# Patient Record
Sex: MALE | Race: WHITE | Employment: OTHER | URBAN - METROPOLITAN AREA
[De-identification: names, ages, dates, MRNs, and addresses within clinical notes are randomized per-mention and may not be internally consistent; named-entity substitution may affect disease eponyms.]

---

## 2018-10-20 ENCOUNTER — HOSPITAL ENCOUNTER (INPATIENT)
Facility: HOSPITAL | Age: 83
LOS: 4 days | Discharge: HOME | DRG: 064 | End: 2018-10-24
Attending: EMERGENCY MEDICINE | Admitting: INTERNAL MEDICINE
Payer: MEDICARE

## 2018-10-20 ENCOUNTER — APPOINTMENT (OUTPATIENT)
Dept: CT IMAGING | Facility: HOSPITAL | Age: 83
DRG: 064 | End: 2018-10-20
Attending: EMERGENCY MEDICINE
Payer: MEDICARE

## 2018-10-20 ENCOUNTER — APPOINTMENT (OUTPATIENT)
Dept: CT IMAGING | Facility: HOSPITAL | Age: 83
DRG: 064 | End: 2018-10-20
Payer: MEDICARE

## 2018-10-20 ENCOUNTER — APPOINTMENT (OUTPATIENT)
Dept: ULTRASOUND IMAGING | Facility: HOSPITAL | Age: 83
DRG: 064 | End: 2018-10-20
Attending: Other
Payer: MEDICARE

## 2018-10-20 DIAGNOSIS — I63.9 ACUTE ISCHEMIC STROKE (HCC): ICD-10-CM

## 2018-10-20 DIAGNOSIS — I63.9 ACUTE CVA (CEREBROVASCULAR ACCIDENT) (HCC): Primary | ICD-10-CM

## 2018-10-20 PROCEDURE — 70450 CT HEAD/BRAIN W/O DYE: CPT | Performed by: EMERGENCY MEDICINE

## 2018-10-20 PROCEDURE — 70498 CT ANGIOGRAPHY NECK: CPT | Performed by: EMERGENCY MEDICINE

## 2018-10-20 PROCEDURE — 99223 1ST HOSP IP/OBS HIGH 75: CPT | Performed by: OTHER

## 2018-10-20 PROCEDURE — 70496 CT ANGIOGRAPHY HEAD: CPT | Performed by: EMERGENCY MEDICINE

## 2018-10-20 RX ORDER — HYDROCODONE BITARTRATE AND ACETAMINOPHEN 5; 325 MG/1; MG/1
2 TABLET ORAL 3 TIMES DAILY PRN
Status: ON HOLD | COMMUNITY
End: 2019-01-08

## 2018-10-20 RX ORDER — ONDANSETRON 2 MG/ML
4 INJECTION INTRAMUSCULAR; INTRAVENOUS EVERY 6 HOURS PRN
Status: DISCONTINUED | OUTPATIENT
Start: 2018-10-20 | End: 2018-10-24

## 2018-10-20 RX ORDER — CLOPIDOGREL 300 MG/1
75 TABLET, FILM COATED ORAL ONCE
Status: ON HOLD | COMMUNITY
End: 2019-01-08

## 2018-10-20 RX ORDER — METOPROLOL TARTRATE 5 MG/5ML
INJECTION INTRAVENOUS
Status: COMPLETED
Start: 2018-10-20 | End: 2018-10-20

## 2018-10-20 RX ORDER — CLOPIDOGREL BISULFATE 75 MG/1
300 TABLET ORAL ONCE
Status: COMPLETED | OUTPATIENT
Start: 2018-10-20 | End: 2018-10-20

## 2018-10-20 RX ORDER — CLOPIDOGREL BISULFATE 75 MG/1
75 TABLET ORAL DAILY
Status: DISCONTINUED | OUTPATIENT
Start: 2018-10-20 | End: 2018-10-24

## 2018-10-20 RX ORDER — METOPROLOL TARTRATE 5 MG/5ML
5 INJECTION INTRAVENOUS EVERY 6 HOURS PRN
Status: DISCONTINUED | OUTPATIENT
Start: 2018-10-20 | End: 2018-10-24

## 2018-10-20 RX ORDER — PRAVASTATIN SODIUM 10 MG
40 TABLET ORAL NIGHTLY
Status: ON HOLD | COMMUNITY
End: 2019-01-08

## 2018-10-20 RX ORDER — ASPIRIN 81 MG/1
324 TABLET, CHEWABLE ORAL DAILY
Status: ON HOLD | COMMUNITY
End: 2019-01-08

## 2018-10-20 RX ORDER — PRAVASTATIN SODIUM 10 MG
10 TABLET ORAL NIGHTLY
Status: DISCONTINUED | OUTPATIENT
Start: 2018-10-20 | End: 2018-10-21

## 2018-10-20 RX ORDER — ASPIRIN 81 MG/1
81 TABLET, CHEWABLE ORAL DAILY
Status: DISCONTINUED | OUTPATIENT
Start: 2018-10-20 | End: 2018-10-24

## 2018-10-20 RX ORDER — HYDROCODONE BITARTRATE AND ACETAMINOPHEN 5; 325 MG/1; MG/1
2 TABLET ORAL 3 TIMES DAILY PRN
Status: DISCONTINUED | OUTPATIENT
Start: 2018-10-20 | End: 2018-10-24

## 2018-10-20 RX ORDER — 0.9 % SODIUM CHLORIDE 0.9 %
VIAL (ML) INJECTION
Status: COMPLETED
Start: 2018-10-20 | End: 2018-10-20

## 2018-10-20 RX ORDER — TAMSULOSIN HYDROCHLORIDE 0.4 MG/1
0.4 CAPSULE ORAL DAILY
Status: ON HOLD | COMMUNITY
End: 2019-01-08

## 2018-10-20 NOTE — CONSULTS
[facility] - [facility]    Report of Consultation    Nasirjessica Funk Patient Status:  Emergency    1935 MRN [id number]   Location [address] Attending Elliott Sanchez MD   Hosp Day # 0 PCP No primary care provide reviewed. No pertinent family history.     Social History  Patient Guardian Status:  Not on file    Other Topics            Concern    None on file    Social History Narrative    None on file            Current Medications:    No current facility-administer symmetric  Brachioradialis 2 + bilateral symmetric  Patellar 2+ bilateral symmetric  Ankle jerk 0 bilateral symmetric    No clonus  No Babinski sign    Coordination:  Finger to nose unable to assess  Rapid alternating movements intact    Gait:  Unable to a the stroke workup will be initiated. MRI brain without contrast  LDL will be checked  Doppler carotids will be done. Echo will be done. Swallow evaluation will be done.   PT/OT evaluation  Continue with antiplatelet therapy    Thank you for allowing me

## 2018-10-20 NOTE — ED PROVIDER NOTES
Patient Seen in: [facility] AND [facility] Emergency Department    History   Patient presents with:  Altered Mental Status (neurologic)    Stated Complaint: confusion    HPI    The patient is an 21-year-old male with a history of atherosclerosis, hypertension, T (Temporal)   Resp 18   Wt 90.5 kg   SpO2 97%         Physical Exam   Constitutional: He appears well-developed and well-nourished. HENT:   Head: Normocephalic and atraumatic.    Mouth/Throat: Oropharynx is clear and moist.   Eyes: Conjunctivae and EOM are Final result                 Please view results for these tests on the individual orders. TYPE AND SCREEN    Narrative: The following orders were created for panel order TYPE AND SCREEN.   Procedure                               Abnormality         S was called immediately at 1511 hours to the emergency department and discussed with Dr. Jim Joshi.       Dictated by (CST): Francisco Frost MD on 10/20/2018 at 15:08     Approved by (CST): Francisco Frost MD on 10/20/2018 at [address]

## 2018-10-20 NOTE — PROGRESS NOTES
10/20/18 1546   Clinical Encounter Type   Visited With Family   Routine Visit Introduction   Continue Visiting Yes   Crisis Visit (stroke alert)   Anglican Encounters   Anglican Needs Prayer       Pt going for testing, follow up once in a room

## 2018-10-20 NOTE — ED NOTES
Patient taken to CT with RN and PCT at this time. Patient confused with expressive aphasia. Onset apx 1 hour ago per daughter at bedside.

## 2018-10-20 NOTE — ED NOTES
Pt's family states at pt went back to bed at 9:30am. At 1:45 pm, his grandson was calling for help to get him up because he wasn't talking clearly. 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

## 2018-10-21 ENCOUNTER — APPOINTMENT (OUTPATIENT)
Dept: CT IMAGING | Facility: HOSPITAL | Age: 83
DRG: 064 | End: 2018-10-21
Attending: Other
Payer: MEDICARE

## 2018-10-21 ENCOUNTER — APPOINTMENT (OUTPATIENT)
Dept: MRI IMAGING | Facility: HOSPITAL | Age: 83
DRG: 064 | End: 2018-10-21
Attending: Other
Payer: MEDICARE

## 2018-10-21 ENCOUNTER — APPOINTMENT (OUTPATIENT)
Dept: CV DIAGNOSTICS | Facility: HOSPITAL | Age: 83
DRG: 064 | End: 2018-10-21
Attending: Other
Payer: MEDICARE

## 2018-10-21 ENCOUNTER — APPOINTMENT (OUTPATIENT)
Dept: ULTRASOUND IMAGING | Facility: HOSPITAL | Age: 83
DRG: 064 | End: 2018-10-21
Attending: Other
Payer: MEDICARE

## 2018-10-21 PROCEDURE — 93306 TTE W/DOPPLER COMPLETE: CPT | Performed by: OTHER

## 2018-10-21 PROCEDURE — 70450 CT HEAD/BRAIN W/O DYE: CPT | Performed by: OTHER

## 2018-10-21 PROCEDURE — 93880 EXTRACRANIAL BILAT STUDY: CPT | Performed by: OTHER

## 2018-10-21 PROCEDURE — 99232 SBSQ HOSP IP/OBS MODERATE 35: CPT | Performed by: OTHER

## 2018-10-21 RX ORDER — HALOPERIDOL 5 MG/ML
INJECTION INTRAMUSCULAR
Status: COMPLETED
Start: 2018-10-21 | End: 2018-10-21

## 2018-10-21 RX ORDER — HALOPERIDOL 5 MG/ML
1 INJECTION INTRAMUSCULAR ONCE
Status: DISCONTINUED | OUTPATIENT
Start: 2018-10-21 | End: 2018-10-24

## 2018-10-21 RX ORDER — 0.9 % SODIUM CHLORIDE 0.9 %
VIAL (ML) INJECTION
Status: COMPLETED
Start: 2018-10-21 | End: 2018-10-21

## 2018-10-21 RX ORDER — LORAZEPAM 2 MG/ML
2 INJECTION INTRAMUSCULAR EVERY 4 HOURS PRN
Status: DISCONTINUED | OUTPATIENT
Start: 2018-10-21 | End: 2018-10-24

## 2018-10-21 RX ORDER — PRAVASTATIN SODIUM 20 MG
20 TABLET ORAL NIGHTLY
Status: DISCONTINUED | OUTPATIENT
Start: 2018-10-21 | End: 2018-10-24

## 2018-10-21 NOTE — CM/SW NOTE
SW met w/ pt and pt's family to discuss recommendations of acute rehab. Family requesting referral to [address] at this time. Per dtrs, pt is affiliated w/ [facility]. SW requested PMR consult from CYNDEE Gage, [address]

## 2018-10-21 NOTE — PHYSICAL THERAPY NOTE
PHYSICAL THERAPY EVALUATION - INPATIENT     Room Number: 320/320-A  Evaluation Date: 10/21/2018  Type of Evaluation: Initial   Physician Order: PT Eval and Treat    Presenting Problem: acute CVA  Reason for Therapy: Mobility Dysfunction and Discharge Plan Daily       PHYSICAL THERAPY MEDICAL/SOCIAL HISTORY   Problem List  Principal Problem:    Acute CVA (cerebrovascular accident) [facility])  Active Problems:    Acute ischemic stroke [facility])      Past Medical History  Past Medical History:   Diagnosis Date   • Athe from lying on back to sitting on the side of the bed?: A Lot   How much help from another person does the patient currently need. ..   -   Moving to and from a bed to a chair (including a wheelchair)?: A Lot   -   Need to walk in hospital room?: A Lot   -

## 2018-10-21 NOTE — H&P
[facility] Patient Status:  Inpatient    1935 MRN [id number]   Location [facility] 3W/SW Attending Mary Kay Macile MD   Hosp Day # 0 PCP No primary care provider on file.      Date:   negative  Ears, nose, mouth, throat, and face: negative  Respiratory: negative for cough, dyspnea on exertion, hemoptysis, sputum and wheezing  Cardiovascular: negative for chest pain, dyspnea, orthopnea, palpitations and syncope  Gastrointestinal: negativ care discussed in detail with daughter bedside. All questions answered.     Certification      PHYSICIAN Certification of Need for Inpatient Hospitalization - Initial Certification    Patient will require inpatient services that will reasonably be expected

## 2018-10-21 NOTE — SLP NOTE
SLP attempted at ; however, pt asleep. SLP collaborated with RN to discuss if it would be appropriate to wake pt for evaluations given his dx/presentation and the fact that he had other evaluations today.  RN stated that pt would need anti-psychotic med

## 2018-10-21 NOTE — PLAN OF CARE
Problem: Patient/Family Goals  Goal: Patient/Family Long Term Goal  Patient's Long Term Goal:   Interventions:  - rehab following discharge  -take all med as prescribed  - See additional Care Plan goals for specific interventions   Outcome: Not Progressing self care with guidance from PT/OT  - Encourage visually impaired, hearing impaired and aphasic patients to use assistive/communication devices  Outcome: Progressing      Problem: PAIN - ADULT  Goal: Verbalizes/displays adequate comfort level or patient's assist at discharge as needed  - Consider post-discharge preferences of patient/family/discharge partner  - Complete POLST form as appropriate  - Assess patient's ability to be responsible for managing their own health  - Refer to Case Management Oswald Jospeh function  INTERVENTIONS:  - Assess patient stability and activity tolerance for standing, transferring and ambulating w/ or w/o assistive devices  - Assist with transfers and ambulation using safe patient handling equipment as needed  - Ensure adequate pro

## 2018-10-21 NOTE — PROGRESS NOTES
[facility]  Neurology Progress Note    Aline Ramirez Patient Status:  Inpatient    1935 MRN [id number]   Location [facility] 3W/SW Attending Bria Hinton MD   Hosp Day # 1 PCP No primary care provider on file.      Subjective:  T confrontation        Fundoscopically- No papilledema or retinal hemorrhages. Normal optic discs, sharp edges. III, IV, VI- EOM intact, TRUDI  V. Facial sensation unable to assess  VII. Face symmetric, no facial weakness  VIII.  Hearing intact to whisper, t MRI brain should be obtained. This report was called immediately at 1511 hours to the emergency department and discussed with Dr. Nicho Kowalski.       Dictated by (CST): Liv Weiner MD on 10/20/2018 at 15:08     Approved by (CST): Liv Weiner MD on 10/20/2018 at Plavix and aspirin. His LDL still not below the 70, however it appears to be in the maximum dose of a statin at this point already. Doppler of carotids did not reveal stenosis that would require intervention.   Patient will start with speech and language

## 2018-10-21 NOTE — PROGRESS NOTES
Pt agitated and trying to get out of bed. Pt unresponsive to ativan. MD's notified. One time dose of haldol ordered, see MAR.

## 2018-10-21 NOTE — PLAN OF CARE
Problem: Patient/Family Goals  Goal: Patient/Family Long Term Goal  Patient's Long Term Goal:   Interventions:  - rehab following discharge  -take all med as prescribed  - See additional Care Plan goals for specific interventions  Outcome: Progressing    G with guidance from PT/OT  - Encourage visually impaired, hearing impaired and aphasic patients to use assistive/communication devices  Outcome: Progressing

## 2018-10-21 NOTE — CONSULTS
[place] [facility] - [facility]    Cardiology Consultation    Matias Rivero Patient Status:  Inpatient    1935 MRN [id number]   Location [facility] 3W/SW Attending Rebekah Bains MD   [facility] Day # 1 PCP No primary care provider on file.      10/ LORazepam (ATIVAN) injection 2 mg, 2 mg, Intravenous, Q4H PRN  •  Pravastatin Sodium (PRAVACHOL) tab 20 mg, 20 mg, Oral, Nightly  •  Clopidogrel Bisulfate (PLAVIX) tab 75 mg, 75 mg, Oral, Daily  •  aspirin chewable tab 81 mg, 81 mg, Oral, Daily  •  HYDROco 10/20/2018    CO2 25 10/20/2018     10/20/2018    CA 9.5 10/20/2018    PTT 26.3 10/20/2018    INR 1.1 10/20/2018    PTP 14.3 10/20/2018    TROP 0.00 10/20/2018    PGLU 107 10/20/2018       Imaging:  Ct Brain Or Head ([id number])    Result Date: 10/21/201 called to Dr. Marck Espinosa at 4:48 PM on 10/20/2018    Dictated by (CST): Harpal Mckeon MD on 10/20/2018 at 16:28     Approved by (CST): Harpal Mckeon MD on 10/20/2018 at 16:50          Ekg 12-lead    Result Date: 10/20/2018  ECG Report  Interpretation  -------

## 2018-10-21 NOTE — OCCUPATIONAL THERAPY NOTE
OCCUPATIONAL THERAPY EVALUATION - INPATIENT     Room Number: 320/320-A  Evaluation Date: 10/21/2018  Type of Evaluation: Initial  Presenting Problem: (CVA)    Physician Order: IP Consult to Occupational Therapy  Reason for Therapy: ADL/IADL Dysfunction and Anticipate patient to d/c to acute rehab pending his ability to participate and attend. Will continue to assess. He was left in bed, with multiple family members at bedside, bed alarm on and all needs met. Notified RN.      DISCHARGE RECOMMENDATIONS  OT Dis High fall risk(Simultaneous filing.  User may not have seen previous data.)    PAIN ASSESSMENT  Ratin     COGNITION  Orientation Level:  disoriented x4    VISION  unable to assess    PERCEPTION  unable to assess    SENSATION  unable to assess    Communi OF in sitting with setup  Comment:     Patient will tolerate standing for 2 minutes in prep for adls with min a    Comment:            Goals  on:   Frequency: 5x a week    [facility] MOT/R  200  [facility]  #18

## 2018-10-22 ENCOUNTER — APPOINTMENT (OUTPATIENT)
Dept: CV DIAGNOSTICS | Facility: HOSPITAL | Age: 83
DRG: 064 | End: 2018-10-22
Attending: INTERNAL MEDICINE
Payer: MEDICARE

## 2018-10-22 PROCEDURE — 99232 SBSQ HOSP IP/OBS MODERATE 35: CPT | Performed by: OTHER

## 2018-10-22 PROCEDURE — 93308 TTE F-UP OR LMTD: CPT | Performed by: INTERNAL MEDICINE

## 2018-10-22 RX ORDER — 0.9 % SODIUM CHLORIDE 0.9 %
VIAL (ML) INJECTION
Status: COMPLETED
Start: 2018-10-22 | End: 2018-10-22

## 2018-10-22 NOTE — PROGRESS NOTES
[facility] AND [facility]  Neurology Progress Note    Tabby Hernandez Patient Status:  Inpatient    1935 MRN [id number]   Location [facility] 3W/SW Attending Kwabena Hernandez MD   Hosp Day # 2 PCP None Pcp     Subjective:  Tabby Hernandez is a(n) comprehend    Cranial Nerves:  II.- Visual fields full to confrontation        Fundoscopically- No papilledema or retinal hemorrhages. Normal optic discs, sharp edges. III, IV, VI- EOM intact, TRUDI  V. Facial sensation unable to assess  VII.  Face symmetr MD on 10/21/2018 at 11:17     Approved by (CST): Julian Herrera MD on 10/21/2018 at 11:19          Ct Stroke Brain (no Iv)(cpt=70450)    Result Date: 10/20/2018  CONCLUSION:  Mild chronic microvascular ischemic disease without acute intracranial a [facility])     Acute ischemic stroke [facility])    Patient most likely with a stroke, most likely involving Wernicke's area. He is left-handed therefore it is difficult to say what side to be on. There may be very subtle right-sided neglect.   Patient is already on

## 2018-10-22 NOTE — PROGRESS NOTES
[facility]    Progress Note    Abelino Morales Patient Status:  Inpatient    1935 MRN [id number]   Location [facility] 3W/SW Attending Darshan Key MD   Hosp Day # 2 PCP None Pcp       SUBJECTIVE:  Pt seen and examined at diet  PT/OT  PMR consult  SW consult for acute rehab     # HLD: Cont Statin     CPM  POC d/w RN               Divya Colmenares  10/22/2018  11:08 AM

## 2018-10-22 NOTE — SLP NOTE
ADULT SPEECH/SWALLOWING EVALUATION    ASSESSMENT    ASSESSMENT/OVERALL IMPRESSION:  SLP BSSE orders received and acknowledged. A swallow evaluation warranted d/t stroke protocol. Pt and family deny any hx of swallowing difficulties.    Pt positioned upright halting;paraphasic expression. At this time, pt presents with a moderate mixed receptive/expressive language disorder. An \"Aphasia\" informational handout was provided for the daughter at bedside.  Communication picture boards were demonstrated and provide EXAMINATION  Dentition: Functional  Symmetry: Unable to assess  Strength: (reduced )  Tone: (reduced )  Range of Motion: (reduced )  Rate of Motion: Reduced    Voice Quality: Clear  Respiratory Status: Unlabored  Consistencies Trialed: Thin liquids; Nectar session(s). In Progress   Goal #6 The patient will complete Confrontation, Responsve naming tasks with min cues with 100 % accuracy within 5 session(s).      In Progress   Goal #7 The patient will complete word repetition Tasks with min cues with 100 % a

## 2018-10-22 NOTE — PLAN OF CARE
Problem: Patient/Family Goals  Goal: Patient/Family Long Term Goal  Patient's Long Term Goal:   Interventions:  - rehab following discharge  -take all med as prescribed  - See additional Care Plan goals for specific interventions   Outcome: Progressing care with guidance from PT/OT  - Encourage visually impaired, hearing impaired and aphasic patients to use assistive/communication devices  Outcome: Progressing      Problem: PAIN - ADULT  Goal: Verbalizes/displays adequate comfort level or patient's state needed  - Consider post-discharge preferences of patient/family/discharge partner  - Complete POLST form as appropriate  - Assess patient's ability to be responsible for managing their own health  - Refer to Case Management Department for coordinating disc stability and activity tolerance for standing, transferring and ambulating w/ or w/o assistive devices  - Assist with transfers and ambulation using safe patient handling equipment as needed  - Ensure adequate protection for wounds/incisions during mobiliz

## 2018-10-22 NOTE — PROGRESS NOTES
[facility]    Cardiology Progress Note    Aretha Kumari Patient Status:  Inpatient    1935 MRN [id number]   Location [facility] 3W/SW Attending Mary Kay Maciel MD   [facility] Day # 2 PCP None Pcp     10/20/2018  Subjective: Pia Hassan Facility-Administered Medications:  LORazepam (ATIVAN) injection 2 mg 2 mg Intravenous Q4H PRN   Pravastatin Sodium (PRAVACHOL) tab 20 mg 20 mg Oral Nightly   haloperidol lactate (HALDOL) 5 MG/ML injection 1 mg 1 mg Intramuscular Once   Clopidogrel Bisulfa

## 2018-10-22 NOTE — CDS QUERY
Altered Level of Consciousness/Decreased Level of Consciousness Without History of Injury  Daryl Kind  Dear Doctor:  Clinical information (provided below) indicates altered level of consciousness and/or decreased level of con

## 2018-10-22 NOTE — PHYSICAL THERAPY NOTE
PHYSICAL THERAPY TREATMENT NOTE - INPATIENT     Room Number: 320/320-A       Presenting Problem: acute CVA    Problem List  Principal Problem:    Acute CVA (cerebrovascular accident) [facility])  Active Problems:    Acute ischemic stroke [facility])      ASSESSMENT Static Sitting: Good  Dynamic Sitting: Fair +           Static Standing: Fair  Dynamic Standing: Fair -    AM-PAC '6-Clicks' INPATIENT SHORT FORM - BASIC MOBILITY  H x 1    Goal #4 Patient will negotiate 14 stairs/one curb w/ assistive device and supervision   Goal #4   Current Status Not tested    Goal #5 Patient to demonstrate independence with home activity/exercise instructions provided to patient in preparation fo

## 2018-10-22 NOTE — OCCUPATIONAL THERAPY NOTE
OCCUPATIONAL THERAPY TREATMENT NOTE - INPATIENT        Room Number: 320/320-A           Presenting Problem: (CVA)    Problem List  Principal Problem:    Acute CVA (cerebrovascular accident) ([address]  Active Problems:    Acute ischemic stroke ([facility] [address]      ASSESS training;Functional transfer training;Cognitive reorientation;Patient/Family education;Patient/Family training;Equipment eval/education; Neuromuscluar reeducation    SUBJECTIVE  \"I need some water\"     OBJECTIVE  Precautions: Bed/chair alarm(Simultaneous Comment: NT    Patient will tolerate standing for 2 minutes in prep for adls with min a    Comment: goal met, pt tolerates ambulating ~3-5min in hallway with CGA.     NEW GOAL: Patient will complete functional transfer with SBA     NEW GOAL: Patient will co

## 2018-10-22 NOTE — PROGRESS NOTES
[place] [facility] - [facility]    Progress Note    Zaira López Patient Status:  Inpatient    1935 MRN [id number]   Location [facility] 3W/SW Attending Madeleine Harrell MD   Hosp Day # 2 PCP None Pcp       SUBJECTIVE:  Pt seen and examined at Statin     Resume other home medication  Med rec done  POC d/w RN        Plan of care discussed in detail with daughter and Son in law at bedside. All questions answered.                    Kamar Benitez

## 2018-10-22 NOTE — CONSULTS
PHYSICAL MEDICINE AND REHABILITATION CONSULTATION     CC: Impaired mobility and ADL dysfunction secondary to stroke      HPI: This is an 26-year-old gentleman who presented to [facility] on October 20 for expressive aphasia and confusion aixa Otherwise a full 10 point review of systems was conducted and is negative.     Current medications: Full medication list has been personally reviewed and include:    [COMPLETED] Sodium Chloride 0.9 % solution      [COMPLETED] Perflutren Lipid Microsphere (D history. Social History:      Social History    Socioeconomic History      Marital status:        Spouse name: Not on file      Number of children: Not on file      Years of education: Not on file      Highest education level: Not on file    Cole    BALANCE ASSESSMENT  Static Sitting: fair  Dynamic Sitting: fair  Static Standing: fair-  Dynamic Standing: fair-     FUNCTIONAL ADL ASSESSMENT  Feeding: Indep         Precautions: High risk for falls    Code status: Full code    OBJECTIVE:    /7 function indicates that patient is reasonably expected to make meaningful and measurable functional progress during a 10 day inpatient rehab stay.  Anticipated goals are to improve from min to mod assist to supervision level to facilitate safe discharge to

## 2018-10-23 RX ORDER — 0.9 % SODIUM CHLORIDE 0.9 %
VIAL (ML) INJECTION
Status: COMPLETED
Start: 2018-10-23 | End: 2018-10-23

## 2018-10-23 NOTE — PHYSICAL THERAPY NOTE
PHYSICAL THERAPY TREATMENT NOTE - INPATIENT     Room Number: 320/320-A       Presenting Problem: acute CVA    Problem List  Principal Problem:    Acute CVA (cerebrovascular accident) [facility])  Active Problems:    Acute ischemic stroke [facility])      ASSESSMENT PAIN ASSESSMENT   Ratin          BALANCE                                                                                                                     Static Sitting: Good  Dynamic Sitting: Good           Static Standing: Fair +  Dynamic Jose Salmon assistance level: supervision   Goal #3   Current Status 150ft with rolling walker min A x 1    Goal #4 Patient will negotiate 14 stairs/one curb w/ assistive device and supervision   Goal #4   Current Status Not tested    Goal #5 Patient to demonstrate in

## 2018-10-23 NOTE — PROGRESS NOTES
[facility]    Progress Note    Antonella Sawant Patient Status:  Inpatient    1935 MRN [id number]   Location [facility] 3W/SW Attending Margarito Rogers MD   Hosp Day # 3 PCP None Pcp       SUBJECTIVE:  Pt seen and examined at noted, recommended for acute rehab.   RACHAEL consult for acute rehab     # HLD: Cont Statin     CPM    Dispo: acute rehab  POC d/w JAYLEN Heard  10/23/2018  2:26 PM

## 2018-10-23 NOTE — TRANSITION NOTE
[facility]    Cardiology Discharge Summary    Sharmaine Cuba Patient Status:  Inpatient    1935 MRN [id number]   Location [facility] 3W/SW Attending Ramesh Kingsley MD   [facility] Day # 3 PCP None Pcp     Discharge Summary:   As GLU  122*   --    BUN  17   --    CREATSERUM  1.06   --    GFRAA  >60  >60   GFRNAA  >60  >60   CA  9.5   --    NA  139   --    K  4.5   --    CL  103   --    CO2  25   --       Recent Labs   Lab  10/20/18   1530   RBC  5.03   HGB  14.6   HCT  44.7   MCV

## 2018-10-23 NOTE — SLP NOTE
SPEECH DAILY NOTE - INPATIENT (SPEECH)    ASSESSMENT & PLAN   ASSESSMENT  Pt seen for speech therapy to complete auditory comprehension and verbal expression training. Speech is intelligible at the conversational level.   The pt demonstrates ability to par session(s). Phrase repetition and completion completed to 70% accuracy.   In Progress       FOLLOW UP  Follow Up Needed: Yes  SLP Follow-up Date: 10/24/18  Number of Visits to Meet Established Goals: 5    Session: 1 post SLE    If you have any questions

## 2018-10-23 NOTE — SLP NOTE
SPEECH DAILY NOTE - INPATIENT (SWALLOWING)    ASSESSMENT & PLAN   ASSESSMENT  Pt seen for swallowing therapy to monitor swallowing tolerance and train swallowing precautions per BSE recommendations.   Collaborated with RN, whom reports pt is tolerating diet bites and sips; No straws; Slow rate; Alternate consistencies;Multiple swallows  Aspiration Precautions: Upright position; Slow rate;Small bites and sips; No straw  Medication Administration Recommendations: Crushed in puree    Patient Experiencing Pain: No family on swallowing precautions. Cues were provided to pt to slow rate and control amount. The pt's daughter was present and assisted with providing cues to pt. The pt and daughter acknowledged awareness of no straws.   In Progress       FOLLOW UP  Foll

## 2018-10-23 NOTE — OCCUPATIONAL THERAPY NOTE
OCCUPATIONAL THERAPY TREATMENT NOTE - INPATIENT        Room Number: 320/320-A           Presenting Problem: (CVA)    Problem List  Principal Problem:    Acute CVA (cerebrovascular accident) ([address]  Active Problems:    Acute ischemic stroke ([facility] [address]      ASSESS filing.  User may not have seen previous data.)    WEIGHT BEARING RESTRICTION  Weight Bearing Restriction: None                PAIN ASSESSMENT  Ratin           ACTIVITY TOLERANCE                         O2 SATURATIONS                ACTIVITIES OF DAILY safety    NEW GOAL: Patient will complete LB dressing with min A using AE/DME PRN  Not assessed today   Goals  on:   Frequency: 5x a week     Olivia Kidd OTR/L  [facility] AND [facility]

## 2018-10-24 VITALS
RESPIRATION RATE: 18 BRPM | BODY MASS INDEX: 27.17 KG/M2 | OXYGEN SATURATION: 95 % | WEIGHT: 189.81 LBS | HEART RATE: 105 BPM | DIASTOLIC BLOOD PRESSURE: 75 MMHG | HEIGHT: 70 IN | SYSTOLIC BLOOD PRESSURE: 125 MMHG | TEMPERATURE: 98 F

## 2018-10-24 RX ORDER — 0.9 % SODIUM CHLORIDE 0.9 %
VIAL (ML) INJECTION
Status: COMPLETED
Start: 2018-10-24 | End: 2018-10-24

## 2018-10-24 NOTE — PHYSICAL THERAPY NOTE
Attempted to see patient for physical therapy session. Patient refused, despite maximal education and encouragement. Patient to D/C to home, despite recommendation for acute rehab.  Patient's daughter present in room is going to follow up with JESSICA MOBLEY [place] and

## 2018-10-24 NOTE — OCCUPATIONAL THERAPY NOTE
OCCUPATIONAL THERAPY TREATMENT NOTE - INPATIENT        Room Number: 320/320-A           Presenting Problem: (CVA)    Problem List  Principal Problem:    Acute CVA (cerebrovascular accident) ([address]  Active Problems:    Acute ischemic stroke ([facility] [address]      ASSESS not have seen previous data.)    WEIGHT BEARING RESTRICTION  Weight Bearing Restriction: None                PAIN ASSESSMENT  Ratin           ACTIVITY TOLERANCE  Pulse: 105  Heart Rate Source: Monitor     BP: 125/75  BP Location: Right arm  BP Method: ability to reach ankles while seated in chair to assist with LB dressing.      Jason Regan

## 2018-10-24 NOTE — SLP NOTE
SPEECH DAILY NOTE - INPATIENT (SWALLOWING)    ASSESSMENT & PLAN   ASSESSMENT  Pt seen for swallowing therapy to monitor swallowing tolerance and train swallowing precautions with upgraded diet of mechanical soft chopped diet and thin liquids/no straw.   Col Recommendations/Plan: Undetermined    Treatment Plan  Treatment Plan/Recommendations: Dysphagia therapy;Videofluoroscopic swallow study    Interdisciplinary Communication: Discussed with RN            GOALS  Goal #1 The patient will tolerate Mechanical sof

## 2018-10-24 NOTE — CM/SW NOTE
RACHAEL informed by RN that pt no longer wants to go to acute rehab. RACHAEL spoke with pt's daughter Kaity Ames [phone] who stated that they want the pt to be discharged when medically stable to home. Pt lives in house with 2 stairs to enter.  Pt lives with his

## 2018-10-24 NOTE — DISCHARGE SUMMARY
[facility] - [facility]    Discharge Summary    Cassie Regan Patient Status:  Inpatient    1935 MRN [id number]   Location [facility] 3W/SW Attending Shy Burrows MD   [facility] Day [facility] PCP None Pcp     Date of Admission: 10/20/2018 Aishwarya Baker get PT/OT as outpatient.       Consultations: Neurology, PMR    Discharge Condition: Stable         Discharge Medications:      Discharge Medications      CONTINUE taking these medications      Instructions Prescription details   aspirin 81 MG Chew      Fariha

## 2018-10-24 NOTE — SLP NOTE
SPEECH DAILY NOTE - INPATIENT (SPEECH)    ASSESSMENT & PLAN   ASSESSMENT  Pt seen for speech therapy to complete auditory comprehension and verbal expression training.   The pt continues to demonstrates word finding errors with hesitations at the [facility] within 5 session(s). The pt completed level 1/concrete categories with 6 words per minute. Pt was easily distracted and required to be re-directed back to task.   New Goal       FOLLOW UP  Follow Up Needed: Yes  SLP Follow-up Date: 10/25/18  Number of

## 2019-01-03 ENCOUNTER — HOSPITAL ENCOUNTER (INPATIENT)
Facility: HOSPITAL | Age: 84
LOS: 5 days | Discharge: ANOTHER HOSPITAL | DRG: 871 | End: 2019-01-09
Attending: EMERGENCY MEDICINE | Admitting: HOSPITALIST
Payer: MEDICARE

## 2019-01-03 ENCOUNTER — APPOINTMENT (OUTPATIENT)
Dept: CT IMAGING | Facility: HOSPITAL | Age: 84
DRG: 871 | End: 2019-01-03
Attending: EMERGENCY MEDICINE
Payer: MEDICARE

## 2019-01-03 ENCOUNTER — APPOINTMENT (OUTPATIENT)
Dept: GENERAL RADIOLOGY | Facility: HOSPITAL | Age: 84
DRG: 871 | End: 2019-01-03
Attending: EMERGENCY MEDICINE
Payer: MEDICARE

## 2019-01-03 DIAGNOSIS — E86.0 DEHYDRATION: Primary | ICD-10-CM

## 2019-01-03 DIAGNOSIS — N28.9 RENAL INSUFFICIENCY: ICD-10-CM

## 2019-01-03 DIAGNOSIS — C25.9 MALIGNANT NEOPLASM OF PANCREAS, UNSPECIFIED LOCATION OF MALIGNANCY (HCC): ICD-10-CM

## 2019-01-03 DIAGNOSIS — R41.82 ALTERED MENTAL STATUS, UNSPECIFIED ALTERED MENTAL STATUS TYPE: ICD-10-CM

## 2019-01-03 LAB
ALBUMIN SERPL BCP-MCNC: 2.8 G/DL (ref 3.5–4.8)
ALP SERPL-CCNC: 67 U/L (ref 32–100)
ALT SERPL-CCNC: 200 U/L (ref 17–63)
ANION GAP SERPL CALC-SCNC: 18 MMOL/L (ref 0–18)
AST SERPL-CCNC: 192 U/L (ref 15–41)
BILIRUB DIRECT SERPL-MCNC: 0.4 MG/DL (ref 0–0.2)
BILIRUB SERPL-MCNC: 1.3 MG/DL (ref 0.3–1.2)
BUN SERPL-MCNC: 33 MG/DL (ref 8–20)
BUN/CREAT SERPL: 17.2 (ref 10–20)
CALCIUM SERPL-MCNC: 8.9 MG/DL (ref 8.5–10.5)
CHLORIDE SERPL-SCNC: 102 MMOL/L (ref 95–110)
CO2 SERPL-SCNC: 23 MMOL/L (ref 22–32)
CREAT SERPL-MCNC: 1.92 MG/DL (ref 0.5–1.5)
GLUCOSE BLDC GLUCOMTR-MCNC: 184 MG/DL (ref 70–99)
GLUCOSE SERPL-MCNC: 206 MG/DL (ref 70–99)
LIPASE SERPL-CCNC: 16 U/L (ref 22–51)
MAGNESIUM SERPL-MCNC: 2.1 MG/DL (ref 1.8–2.5)
OSMOLALITY UR CALC.SUM OF ELEC: 309 MOSM/KG (ref 275–295)
POTASSIUM SERPL-SCNC: 4.1 MMOL/L (ref 3.3–5.1)
PROT SERPL-MCNC: 6.2 G/DL (ref 5.9–8.4)
SODIUM SERPL-SCNC: 143 MMOL/L (ref 136–144)

## 2019-01-03 PROCEDURE — 71045 X-RAY EXAM CHEST 1 VIEW: CPT | Performed by: EMERGENCY MEDICINE

## 2019-01-03 RX ORDER — MORPHINE SULFATE 4 MG/ML
4 INJECTION, SOLUTION INTRAMUSCULAR; INTRAVENOUS ONCE
Status: COMPLETED | OUTPATIENT
Start: 2019-01-03 | End: 2019-01-03

## 2019-01-03 RX ORDER — ONDANSETRON 2 MG/ML
4 INJECTION INTRAMUSCULAR; INTRAVENOUS ONCE
Status: COMPLETED | OUTPATIENT
Start: 2019-01-03 | End: 2019-01-03

## 2019-01-04 ENCOUNTER — APPOINTMENT (OUTPATIENT)
Dept: CT IMAGING | Facility: HOSPITAL | Age: 84
DRG: 871 | End: 2019-01-04
Attending: EMERGENCY MEDICINE
Payer: MEDICARE

## 2019-01-04 ENCOUNTER — APPOINTMENT (OUTPATIENT)
Dept: GENERAL RADIOLOGY | Facility: HOSPITAL | Age: 84
DRG: 871 | End: 2019-01-04
Attending: INTERNAL MEDICINE
Payer: MEDICARE

## 2019-01-04 ENCOUNTER — APPOINTMENT (OUTPATIENT)
Dept: ULTRASOUND IMAGING | Facility: HOSPITAL | Age: 84
DRG: 871 | End: 2019-01-04
Attending: Other
Payer: MEDICARE

## 2019-01-04 PROBLEM — E86.0 DEHYDRATION: Status: ACTIVE | Noted: 2019-01-04

## 2019-01-04 PROBLEM — R73.9 HYPERGLYCEMIA: Status: ACTIVE | Noted: 2019-01-04

## 2019-01-04 PROBLEM — N17.9 ACUTE KIDNEY INJURY (HCC): Status: ACTIVE | Noted: 2019-01-04

## 2019-01-04 PROBLEM — N28.9 RENAL INSUFFICIENCY: Status: ACTIVE | Noted: 2019-01-04

## 2019-01-04 PROBLEM — D70.3 NEUTROPENIA ASSOCIATED WITH INFECTION (HCC): Status: ACTIVE | Noted: 2019-01-04

## 2019-01-04 PROBLEM — A41.9 SEPSIS (HCC): Status: ACTIVE | Noted: 2019-01-04

## 2019-01-04 PROBLEM — C25.9 MALIGNANT NEOPLASM OF PANCREAS, UNSPECIFIED LOCATION OF MALIGNANCY (HCC): Status: ACTIVE | Noted: 2019-01-04

## 2019-01-04 PROBLEM — R41.82 ALTERED MENTAL STATUS, UNSPECIFIED ALTERED MENTAL STATUS TYPE: Status: ACTIVE | Noted: 2019-01-04

## 2019-01-04 LAB
ADENOVIRUS PCR:: NEGATIVE
AMMONIA PLAS-MCNC: 39 UG/DL (ref 28.2–80.4)
ANION GAP SERPL CALC-SCNC: 10 MMOL/L (ref 0–18)
B PERT DNA SPEC QL NAA+PROBE: NEGATIVE
BACTERIA UR QL AUTO: NEGATIVE /HPF
BASOPHILS # BLD: 0 K/UL (ref 0–0.2)
BASOPHILS NFR BLD: 0 %
BILIRUB UR QL: NEGATIVE
BUN SERPL-MCNC: 38 MG/DL (ref 8–20)
BUN/CREAT SERPL: 25 (ref 10–20)
C PNEUM DNA SPEC QL NAA+PROBE: NEGATIVE
CALCIUM SERPL-MCNC: 7.6 MG/DL (ref 8.5–10.5)
CHLORIDE SERPL-SCNC: 111 MMOL/L (ref 95–110)
CLARITY UR: CLEAR
CO2 SERPL-SCNC: 19 MMOL/L (ref 22–32)
COLOR UR: YELLOW
CORONAVIRUS 229E PCR:: NEGATIVE
CORONAVIRUS HKU1 PCR:: NEGATIVE
CORONAVIRUS NL63 PCR:: NEGATIVE
CORONAVIRUS OC43 PCR:: NEGATIVE
CREAT SERPL-MCNC: 1.52 MG/DL (ref 0.5–1.5)
EOSINOPHIL # BLD: 0 K/UL (ref 0–0.7)
EOSINOPHIL NFR BLD: 0 %
ERYTHROCYTE [DISTWIDTH] IN BLOOD BY AUTOMATED COUNT: 14.5 % (ref 11–15)
FLUAV RNA SPEC QL NAA+PROBE: NEGATIVE
FLUBV RNA SPEC QL NAA+PROBE: NEGATIVE
GLUCOSE SERPL-MCNC: 190 MG/DL (ref 70–99)
GLUCOSE UR-MCNC: NEGATIVE MG/DL
HCT VFR BLD AUTO: 49.3 % (ref 41–52)
HGB BLD-MCNC: 16.2 G/DL (ref 13.5–17.5)
HGB UR QL STRIP.AUTO: NEGATIVE
HYALINE CASTS #/AREA URNS AUTO: 3 /LPF
LACTATE SERPL-SCNC: 1.6 MMOL/L (ref 0.5–2.2)
LACTATE SERPL-SCNC: 2.3 MMOL/L (ref 0.5–2.2)
LACTATE SERPL-SCNC: 2.5 MMOL/L (ref 0.5–2.2)
LACTATE SERPL-SCNC: 6.3 MMOL/L (ref 0.5–2.2)
LEUKOCYTE ESTERASE UR QL STRIP.AUTO: NEGATIVE
LYMPHOCYTES # BLD: 0.4 K/UL (ref 1–4)
LYMPHOCYTES NFR BLD: 38 %
MAGNESIUM SERPL-MCNC: 1.8 MG/DL (ref 1.8–2.5)
MCH RBC QN AUTO: 29.3 PG (ref 27–32)
MCHC RBC AUTO-ENTMCNC: 32.8 G/DL (ref 32–37)
MCV RBC AUTO: 89.3 FL (ref 80–100)
METAPNEUMOVIRUS PCR:: NEGATIVE
MONOCYTES # BLD: 0.1 K/UL (ref 0–1)
MONOCYTES NFR BLD: 9 %
MRSA DNA SPEC QL NAA+PROBE: NEGATIVE
MYCOPLASMA PNEUMONIA PCR:: NEGATIVE
NEUTROPHILS # BLD AUTO: 0.6 K/UL (ref 1.8–7.7)
NEUTROPHILS NFR BLD: 50 %
NEUTS BAND NFR BLD: 2 %
NITRITE UR QL STRIP.AUTO: NEGATIVE
OSMOLALITY UR CALC.SUM OF ELEC: 304 MOSM/KG (ref 275–295)
PARAINFLUENZA 1 PCR:: NEGATIVE
PARAINFLUENZA 2 PCR:: NEGATIVE
PARAINFLUENZA 3 PCR:: NEGATIVE
PARAINFLUENZA 4 PCR:: NEGATIVE
PH UR: 5 [PH] (ref 5–8)
PLATELET # BLD AUTO: 557 K/UL (ref 140–400)
PMV BLD AUTO: 9.5 FL (ref 7.4–10.3)
POTASSIUM SERPL-SCNC: 4.1 MMOL/L (ref 3.3–5.1)
PROCALCITONIN SERPL-MCNC: 4.47 NG/ML (ref ?–0.11)
PROT UR-MCNC: NEGATIVE MG/DL
RBC # BLD AUTO: 5.52 M/UL (ref 4.5–5.9)
RBC #/AREA URNS AUTO: <1 /HPF
RHINOVIRUS/ENTERO PCR:: NEGATIVE
RSV RNA SPEC QL NAA+PROBE: NEGATIVE
SODIUM SERPL-SCNC: 140 MMOL/L (ref 136–144)
SP GR UR STRIP: 1.03 (ref 1–1.03)
UROBILINOGEN UR STRIP-ACNC: <2
VARIANT LYMPHS NFR BLD MANUAL: 1 %
VIT C UR-MCNC: NEGATIVE MG/DL
WBC # BLD AUTO: 1.1 K/UL (ref 4–11)
WBC #/AREA URNS AUTO: 0 /HPF

## 2019-01-04 PROCEDURE — 70450 CT HEAD/BRAIN W/O DYE: CPT | Performed by: EMERGENCY MEDICINE

## 2019-01-04 PROCEDURE — 99223 1ST HOSP IP/OBS HIGH 75: CPT | Performed by: INTERNAL MEDICINE

## 2019-01-04 PROCEDURE — 71045 X-RAY EXAM CHEST 1 VIEW: CPT | Performed by: INTERNAL MEDICINE

## 2019-01-04 PROCEDURE — 93880 EXTRACRANIAL BILAT STUDY: CPT | Performed by: OTHER

## 2019-01-04 PROCEDURE — 99291 CRITICAL CARE FIRST HOUR: CPT | Performed by: HOSPITALIST

## 2019-01-04 PROCEDURE — 71260 CT THORAX DX C+: CPT | Performed by: EMERGENCY MEDICINE

## 2019-01-04 PROCEDURE — 74177 CT ABD & PELVIS W/CONTRAST: CPT | Performed by: EMERGENCY MEDICINE

## 2019-01-04 PROCEDURE — 99223 1ST HOSP IP/OBS HIGH 75: CPT | Performed by: HOSPITALIST

## 2019-01-04 PROCEDURE — 99291 CRITICAL CARE FIRST HOUR: CPT | Performed by: INTERNAL MEDICINE

## 2019-01-04 PROCEDURE — 99223 1ST HOSP IP/OBS HIGH 75: CPT | Performed by: OTHER

## 2019-01-04 RX ORDER — METOPROLOL TARTRATE 5 MG/5ML
5 INJECTION INTRAVENOUS ONCE
Status: COMPLETED | OUTPATIENT
Start: 2019-01-04 | End: 2019-01-04

## 2019-01-04 RX ORDER — MORPHINE SULFATE 2 MG/ML
1 INJECTION, SOLUTION INTRAMUSCULAR; INTRAVENOUS EVERY 2 HOUR PRN
Status: DISCONTINUED | OUTPATIENT
Start: 2019-01-04 | End: 2019-01-09

## 2019-01-04 RX ORDER — HYDRALAZINE HYDROCHLORIDE 20 MG/ML
10 INJECTION INTRAMUSCULAR; INTRAVENOUS EVERY 4 HOURS PRN
Status: DISCONTINUED | OUTPATIENT
Start: 2019-01-04 | End: 2019-01-09

## 2019-01-04 RX ORDER — METOPROLOL TARTRATE 5 MG/5ML
5 INJECTION INTRAVENOUS EVERY 4 HOURS
Status: DISCONTINUED | OUTPATIENT
Start: 2019-01-04 | End: 2019-01-09

## 2019-01-04 RX ORDER — 0.9 % SODIUM CHLORIDE 0.9 %
VIAL (ML) INJECTION
Status: COMPLETED
Start: 2019-01-04 | End: 2019-01-04

## 2019-01-04 RX ORDER — MORPHINE SULFATE 2 MG/ML
2 INJECTION, SOLUTION INTRAMUSCULAR; INTRAVENOUS EVERY 2 HOUR PRN
Status: DISCONTINUED | OUTPATIENT
Start: 2019-01-04 | End: 2019-01-09

## 2019-01-04 RX ORDER — METOPROLOL TARTRATE 5 MG/5ML
5 INJECTION INTRAVENOUS EVERY 6 HOURS
Status: DISCONTINUED | OUTPATIENT
Start: 2019-01-04 | End: 2019-01-04

## 2019-01-04 RX ORDER — MAGNESIUM SULFATE HEPTAHYDRATE 40 MG/ML
2 INJECTION, SOLUTION INTRAVENOUS ONCE
Status: COMPLETED | OUTPATIENT
Start: 2019-01-04 | End: 2019-01-04

## 2019-01-04 RX ORDER — METOPROLOL TARTRATE 5 MG/5ML
INJECTION INTRAVENOUS
Status: DISCONTINUED
Start: 2019-01-04 | End: 2019-01-04 | Stop reason: WASHOUT

## 2019-01-04 RX ORDER — 0.9 % SODIUM CHLORIDE 0.9 %
VIAL (ML) INJECTION
Status: DISPENSED
Start: 2019-01-04 | End: 2019-01-04

## 2019-01-04 RX ORDER — HEPARIN SODIUM 5000 [USP'U]/ML
5000 INJECTION, SOLUTION INTRAVENOUS; SUBCUTANEOUS EVERY 12 HOURS
Status: DISCONTINUED | OUTPATIENT
Start: 2019-01-04 | End: 2019-01-09

## 2019-01-04 RX ORDER — MORPHINE SULFATE 4 MG/ML
4 INJECTION, SOLUTION INTRAMUSCULAR; INTRAVENOUS EVERY 2 HOUR PRN
Status: DISCONTINUED | OUTPATIENT
Start: 2019-01-04 | End: 2019-01-09

## 2019-01-04 RX ORDER — ONDANSETRON 2 MG/ML
4 INJECTION INTRAMUSCULAR; INTRAVENOUS EVERY 6 HOURS PRN
Status: DISCONTINUED | OUTPATIENT
Start: 2019-01-04 | End: 2019-01-09

## 2019-01-04 RX ORDER — SODIUM CHLORIDE 9 MG/ML
INJECTION, SOLUTION INTRAVENOUS CONTINUOUS
Status: DISCONTINUED | OUTPATIENT
Start: 2019-01-04 | End: 2019-01-08

## 2019-01-04 RX ORDER — MORPHINE SULFATE 4 MG/ML
4 INJECTION, SOLUTION INTRAMUSCULAR; INTRAVENOUS ONCE
Status: COMPLETED | OUTPATIENT
Start: 2019-01-04 | End: 2019-01-04

## 2019-01-04 NOTE — PROGRESS NOTES
[facility] Pharmacy Note:  Renal Adjustment for  Meropenem    Yesica Lowe is a 81year old male who has been prescribed Meropenem 500mg every 8 hrs. CrCl is estimated creatinine clearance is 30.6 mL/min (A) (based on SCr of 1.92 mg/dL (H)).  so the dose has

## 2019-01-04 NOTE — PLAN OF CARE
Problem: Safety Risk - Non-Violent Restraints  Goal: Patient will remain free from self-harm  INTERVENTIONS:  - Apply the least restrictive restraint to prevent harm  - Notify patient and family of reasons restraints applied  - Assess for any contributing FALL  Goal: Free from fall injury  INTERVENTIONS:  - Assess pt frequently for physical needs  - Identify cognitive and physical deficits and behaviors that affect risk of falls.   - [place] fall precautions as indicated by assessment.  - Educate pt/family SOB or any respiratory difficulty  - Respiratory Therapy support as indicated  - Manage/alleviate anxiety  - Monitor for signs/symptoms of CO2 retention  Outcome: Not Progressing  Pt is on non rebreather mask and uses accessory muscles to breath.  Has shall

## 2019-01-04 NOTE — SPIRITUAL CARE NOTE
PT was sleepy per daughter Leah Gallo, who requested that a  come back later. A  will stop by this weekend.  GG

## 2019-01-04 NOTE — PROGRESS NOTES
[facility] - [facility]    Progress Note    Destiny Lustchucho Patient Status:  Inpatient    1935 MRN [id number]   Location [facility] 2W/SW Attending Joanne Gomez MD   Hosp Day # 0 PCP None Pcp       Subjective:   Destiny Garcia is INR 1.1 10/20/2018    LIP 16 (L) 01/03/2019    MG 1.8 01/04/2019    TROP 0.00 10/20/2018       Ct Brain Or Head (18319)    Result Date: 1/4/2019  CONCLUSION:  Motion-compromised examination. Within these parameters: 1.  There is poorly defined hypodensit vancomycin     Lethargy  -may be related to likely sepsis  -ct brain notable to hypodensity in lf. Parietal lobe which may reflect acute or evolving ischemia.     -plan MRI- family agreeable (change to with and without iv contrast)- when stable  -plan neuro

## 2019-01-04 NOTE — PROGRESS NOTES
[facility]    CDI Prediction Tool Protocol (Vancomycin Initiated)    OVP (oral vancomycin prophylaxis) 125 mg PO Daily is being started in this patient based on a score of 14.       Score Breakdown:  High risk antibiotic use (5 points)  Malignanc

## 2019-01-04 NOTE — ED PROVIDER NOTES
Signout taken with disposition of PCU admission and CT scans pending. Same as noted below, UA without UTI; PCU admission as previously noted. CT head without contrast  CT chest, abdomen, and pelvis with IV contrast    Comparison: No prior CTs.     Chanel Call printed above. DD:  01/04/2019/DT:  01/04/2019     This report contains privileged and confidential information and is intended solely for the use of the individual or entity to which it is addressed.  If you are not the intended recipient of this report

## 2019-01-04 NOTE — CONSULTS
Pulmonary/Critical Care Consultation Note    HPI:   Yg Emery is a 81year old male with Patient presents with:  Dyspnea HANNAH SOB (respiratory)    None Pcp     Pt is a 82 yo with CHF, CAD, HTN, PE, CVA, and recent dx of pancreatic cancer s/p cycle # Meropenem (MERREM) 500 mg in sodium chloride 0.9% 100 mL  mg Intravenous Q12H   Pantoprazole Sodium (PROTONIX) 40 mg in Sodium Chloride 0.9 % 10 mL IV push 40 mg Intravenous Q24H   Vancomycin HCl (VANCOCIN) 1,250 mg in sodium chloride 0.9 % 500 mL /62 (BP Location: Right arm)   Pulse 117   Temp 99 °F (37.2 °C) (Temporal)   Resp (!) 28   Wt 171 lb 1.6 oz (77.6 kg)   SpO2 93%   BMI 24.55 kg/m²     Intake/Output Summary (Last 24 hours) at 1/4/2019 1433  Last data filed at 1/4/2019 1300  Gross p

## 2019-01-04 NOTE — ED PROVIDER NOTES
Patient Seen in: [facility] AND [facility] Emergency Department    History   Patient presents with:  Dyspnea HANNAH SOB (respiratory)    Stated Complaint: not acting right per fam    HPI    81-year-old male currently under recent initiation of chemotherapy treatmen 01/03/19 2144 (!) 126/93   Pulse 01/03/19 2144 (!) 174   Resp 01/03/19 2144 24   Temp 01/03/19 2148 98.4 °F (36.9 °C)   Temp src 01/03/19 2148 Temporal   SpO2 01/03/19 2144 94 %   O2 Device 01/03/19 2144 Non-rebreather mask       Current:/85 (BP Loca  (*)      (*)     Bilirubin, Total 1.3 (*)     Albumin 2.8 (*)     Bilirubin, Direct 0.4 (*)     All other components within normal limits   LIPASE - Abnormal; Notable for the following components:    Lipase 16 (*)     All other components components within normal limits   COMP METABOLIC PANEL (14) - Abnormal; Notable for the following components:    Glucose 131 (*)     Chloride 112 (*)     CO2 20 (*)     BUN 32 (*)     Calcium, Total 7.3 (*)      (*)     AST 86 (*)     Total Protein Manual 0.03 (*)     All other components within normal limits   MAGNESIUM - Normal   AMMONIA, PLASMA - Normal   LACTIC ACID 3 HR POST POSITIVE - Normal   MAGNESIUM - Normal   MAGNESIUM - Normal   RESPIRATORY PANEL FLU EXPANDED - Normal   ED/MRSA SCREEN BY TALL (BNP)   BLOOD CULTURE   BLOOD CULTURE   CBC W/ DIFFERENTIAL     EKG    Rate, intervals and axes as noted on EKG Report.   Rate: 170  Rhythm: Sinus Rhythm  Reading: Marked sinus tachycardia, likely rate related lateral ST segment changes, no obvious acu exam, bedside monitoring of interventions, collecting and interpreting tests and discussion with consultants but not including time spent performing procedures.   Admission disposition: 1/4/2019  3:46 AM         Disposition and Plan     Clinical Impression:

## 2019-01-04 NOTE — CM/SW NOTE
Received MDO for PHQ4 & verify social support. Attempted to meet with patient x2 but asleep. Attempted to contact daughter Clau Kendall but no answer or vmail. Per SW notes from previous admission (10/2018).  Patient lives in a house w/ his daughter Clau Kendlal & her

## 2019-01-04 NOTE — SLP NOTE
Per RN, Pt with reduced alertness and increased secretions noted. Not appropriate for BSE at this time. Will f/u on 1/05/19 as appropriate.     Fatou Jung M.S. CCC/SLP  Speech-Language Pathologist  [facility]  #[id number]

## 2019-01-04 NOTE — PLAN OF CARE
Pt has been receiving chemotherapy for the last 2 weeks at [facility] from [facility]. Pt on Gemetabine and Paclitaxol regimen.

## 2019-01-04 NOTE — H&P
[facility] Patient Status:  Inpatient    1935 MRN [id number]   Location [facility] 2W/SW Attending Loi Guevara MD   Hosp Day # 0 PCP None Pcp     Date:  2019  Date of Admiss HYDROcodone-acetaminophen 5-325 MG Oral Tab 1/2/2019 at 1400  Yes Yes   Sig: Take 2 tablets by mouth 3 (three) times daily as needed for Pain. Pravastatin Sodium 10 MG Oral Tab 1/2/2019 at 2100  Yes No   Sig: Take 40 mg by mouth nightly.      aspirin 81 01/03/2019    HGB 16.2 01/03/2019    HCT 49.3 01/03/2019     01/03/2019    CREATSERUM 1.92 01/03/2019    BUN 33 01/03/2019     01/03/2019    K 4.1 01/03/2019     01/03/2019    CO2 23 01/03/2019     01/03/2019    CA 8.9 01/03/2019

## 2019-01-04 NOTE — CONSULTS
[facility]    Cardiology Consultation    Maria R Krueger Patient Status:  Inpatient    1935 MRN [id number]   Location [facility] 2W/SW Attending Rosy Lam MD   Hosp Day # 0 PCP None Pcp     1/3/2019  Reason for Consu drugs.     Allergies:    Ativan [Lorazepam]      JITTERY    Medications:    Current Facility-Administered Medications:   •  sodium chloride 0.9% IV bolus 2,583 mL, 30 mL/kg, Intravenous, Continuous  •  influenza virus vaccine (FLUAD) ages 72 years and older anicteric sclera, neck supple. Neck: No JVD, carotids 2+, no bruits. Cardiac: Tachycardic, regular. No murmurs. Lungs: Clear without wheezes, rales, rhonchi or dullness. Normal excursions and effort. Abdomen: Soft.   Extremities: Without clubbing, cya submitted and there is agreement without major discrepancies. Dictated by (CST): North Hansen MD on 1/04/2019 at 10:45     Approved by (CST):  North Hansen MD on 1/04/2019 at 10:47          Ct Chest Pain/pe (iv Only) Em    Result Date: 1/4/201 Date: 1/4/2019  ECG Report  Interpretation  --------------------------     Ekg 12-lead    Result Date: 1/3/2019  ECG Report  Interpretation  --------------------------       Impression:  Patient Active Problem List:     Acute CVA (cerebrovascular accident)

## 2019-01-04 NOTE — PLAN OF CARE
Problem: Safety Risk - Non-Violent Restraints  Goal: Patient will remain free from self-harm  INTERVENTIONS:  - Apply the least restrictive restraint to prevent harm  - Notify patient and family of reasons restraints applied  - Assess for any contributing changes in respiratory status  - Assess for changes in mentation and behavior  - Position to facilitate oxygenation and minimize respiratory effort  - Oxygen supplementation based on oxygen saturation or ABGs  - Provide Smoking Cessation handout, if applic

## 2019-01-04 NOTE — PLAN OF CARE
- persisting elevated   - amio bolus and gtt per protocol  - continue BB  - d/w Dr Palacios Ek  - ctm      JONN Hernandes  [facility]  01/04/19

## 2019-01-04 NOTE — PROGRESS NOTES
[facility] dosing service    Initial Pharmacokinetic Consult for Vancomycin Dosing     Melida Gong is a 81year old male admitted on 1/3 who is being treated for sepsis.   Pharmacy has been asked to dose Vancomycin by Dr. Joanne Maradiaga    He is allergic to a Negative    Parainlfuenza 3 PCR Negative    Parainlfuenza 4 PCR Negative    Resp Syncytial Virus PCR Negative    Bordetella Pertussis PCR Negative    Chlamydia pneumonia PCR: Negative    Mycoplasma pneumonia PCR: Negative   Blood Culture FREQ X 2 [[id number] [address] [place] [address] Extension: [phone]

## 2019-01-04 NOTE — SEPSIS REASSESSMENT
[facility]    Sepsis Reassessment Note    BP (!) 146/128 (BP Location: Left arm)   Pulse (!) 159   Temp 100.4 °F (38 °C) (Temporal)   Resp (!) 35   Wt 171 lb 1.6 oz (77.6 kg)   SpO2 98%   BMI 24.55 kg/m²      8:06 AM    Cardiac:  Regulari

## 2019-01-04 NOTE — ED INITIAL ASSESSMENT (HPI)
Pt brought via ems for 'not acting right' per family. Per ems 43/32 160 15L NRB 90-93%       Hx pancreatic caner.  Per family pt has not been eating for as alert as he has been or compliant with chemo

## 2019-01-04 NOTE — CONSULTS
Neurology Inpatient Consult Note    Zaira López : 1935   Referring Physician: Dr. Kris Crooks  HPI:     Zaira óLpez is a 81year old male who is being seen in neurologic evaluation.     Patient is being seen in evaluation for altered me SpO2 93%   BMI 24.55 kg/m²    General: mild distress  CV: regular rate and rhythm   Lungs: clear to auscultation bilaterally  Neuro:  Mental Status: Very drowsy, unclear whether she follows simple commands    Cranial Nerves: pupils equal, round, and react ASSESSMENT AND PLAN:   Altered mental state  Hypodensity on head CT  To my eye, hypodensity on head CT most consistent with a area of watershed ischemia between MCA/PCA territories on left side.   I suspect the vast majority of altered sensorium accou

## 2019-01-05 ENCOUNTER — APPOINTMENT (OUTPATIENT)
Dept: PICC SERVICES | Facility: HOSPITAL | Age: 84
DRG: 871 | End: 2019-01-05
Attending: INTERNAL MEDICINE
Payer: MEDICARE

## 2019-01-05 ENCOUNTER — APPOINTMENT (OUTPATIENT)
Dept: CV DIAGNOSTICS | Facility: HOSPITAL | Age: 84
DRG: 871 | End: 2019-01-05
Attending: INTERNAL MEDICINE
Payer: MEDICARE

## 2019-01-05 LAB
ALBUMIN SERPL BCP-MCNC: 1.6 G/DL (ref 3.5–4.8)
ALBUMIN/GLOB SERPL: 0.6 {RATIO} (ref 1–2)
ALP SERPL-CCNC: 50 U/L (ref 32–100)
ALT SERPL-CCNC: 117 U/L (ref 17–63)
ANION GAP SERPL CALC-SCNC: 7 MMOL/L (ref 0–18)
ANION GAP SERPL CALC-SCNC: 9 MMOL/L (ref 0–18)
AST SERPL-CCNC: 86 U/L (ref 15–41)
BASOPHILS # BLD: 0 K/UL (ref 0–0.2)
BASOPHILS NFR BLD: 0 %
BILIRUB SERPL-MCNC: 0.9 MG/DL (ref 0.3–1.2)
BUN SERPL-MCNC: 32 MG/DL (ref 8–20)
BUN SERPL-MCNC: 32 MG/DL (ref 8–20)
BUN/CREAT SERPL: 25.4 (ref 10–20)
BUN/CREAT SERPL: 27.8 (ref 10–20)
CALCIUM SERPL-MCNC: 7.2 MG/DL (ref 8.5–10.5)
CALCIUM SERPL-MCNC: 7.3 MG/DL (ref 8.5–10.5)
CHLORIDE SERPL-SCNC: 112 MMOL/L (ref 95–110)
CHLORIDE SERPL-SCNC: 113 MMOL/L (ref 95–110)
CHOLEST SERPL-MCNC: 87 MG/DL (ref 110–200)
CO2 SERPL-SCNC: 20 MMOL/L (ref 22–32)
CO2 SERPL-SCNC: 22 MMOL/L (ref 22–32)
CREAT SERPL-MCNC: 1.15 MG/DL (ref 0.5–1.5)
CREAT SERPL-MCNC: 1.26 MG/DL (ref 0.5–1.5)
EOSINOPHIL # BLD: 0 K/UL (ref 0–0.7)
EOSINOPHIL NFR BLD: 0 %
ERYTHROCYTE [DISTWIDTH] IN BLOOD BY AUTOMATED COUNT: 14.1 % (ref 11–15)
GLOBULIN PLAS-MCNC: 2.5 G/DL (ref 2.5–3.7)
GLUCOSE SERPL-MCNC: 131 MG/DL (ref 70–99)
GLUCOSE SERPL-MCNC: 139 MG/DL (ref 70–99)
HCT VFR BLD AUTO: 33.8 % (ref 41–52)
HDLC SERPL-MCNC: 27 MG/DL
HGB BLD-MCNC: 11.3 G/DL (ref 13.5–17.5)
LDLC SERPL CALC-MCNC: 36 MG/DL (ref 0–99)
LYMPHOCYTES # BLD: 0.7 K/UL (ref 1–4)
LYMPHOCYTES NFR BLD: 25 %
MAGNESIUM SERPL-MCNC: 2.2 MG/DL (ref 1.8–2.5)
MCH RBC QN AUTO: 29.7 PG (ref 27–32)
MCHC RBC AUTO-ENTMCNC: 33.4 G/DL (ref 32–37)
MCV RBC AUTO: 88.9 FL (ref 80–100)
METAMYELOCYTES # BLD MANUAL: 0.03 K/UL
METAMYELOCYTES # BLD MANUAL: 0.03 K/UL
METAMYELOCYTES NFR BLD: 1 %
MONOCYTES # BLD: 0.2 K/UL (ref 0–1)
MONOCYTES NFR BLD: 8 %
MYELOCYTES NFR BLD: 1 %
NEUTROPHILS # BLD AUTO: 1.9 K/UL (ref 1.8–7.7)
NEUTROPHILS NFR BLD: 60 %
NEUTS BAND NFR BLD: 5 %
NONHDLC SERPL-MCNC: 60 MG/DL
OSMOLALITY UR CALC.SUM OF ELEC: 301 MOSM/KG (ref 275–295)
OSMOLALITY UR CALC.SUM OF ELEC: 303 MOSM/KG (ref 275–295)
PLATELET # BLD AUTO: 270 K/UL (ref 140–400)
PMV BLD AUTO: 9.4 FL (ref 7.4–10.3)
POTASSIUM SERPL-SCNC: 3.8 MMOL/L (ref 3.3–5.1)
POTASSIUM SERPL-SCNC: 3.9 MMOL/L (ref 3.3–5.1)
PROT SERPL-MCNC: 4.1 G/DL (ref 5.9–8.4)
RBC # BLD AUTO: 3.8 M/UL (ref 4.5–5.9)
SODIUM SERPL-SCNC: 141 MMOL/L (ref 136–144)
SODIUM SERPL-SCNC: 142 MMOL/L (ref 136–144)
TRIGL SERPL-MCNC: 120 MG/DL (ref 1–149)
VANCOMYCIN TROUGH SERPL-MCNC: 8.7 MCG/ML (ref 10–20)
WBC # BLD AUTO: 2.9 K/UL (ref 4–11)

## 2019-01-05 PROCEDURE — 99233 SBSQ HOSP IP/OBS HIGH 50: CPT | Performed by: OTHER

## 2019-01-05 PROCEDURE — 99291 CRITICAL CARE FIRST HOUR: CPT | Performed by: INTERNAL MEDICINE

## 2019-01-05 PROCEDURE — 99232 SBSQ HOSP IP/OBS MODERATE 35: CPT | Performed by: INTERNAL MEDICINE

## 2019-01-05 PROCEDURE — 02HV33Z INSERTION OF INFUSION DEVICE INTO SUPERIOR VENA CAVA, PERCUTANEOUS APPROACH: ICD-10-PCS | Performed by: INTERNAL MEDICINE

## 2019-01-05 PROCEDURE — 93306 TTE W/DOPPLER COMPLETE: CPT | Performed by: INTERNAL MEDICINE

## 2019-01-05 RX ORDER — SODIUM CHLORIDE 9 MG/ML
INJECTION, SOLUTION INTRAVENOUS
Status: COMPLETED
Start: 2019-01-05 | End: 2019-01-05

## 2019-01-05 RX ORDER — 0.9 % SODIUM CHLORIDE 0.9 %
VIAL (ML) INJECTION
Status: DISPENSED
Start: 2019-01-05 | End: 2019-01-06

## 2019-01-05 RX ORDER — 0.9 % SODIUM CHLORIDE 0.9 %
VIAL (ML) INJECTION
Status: COMPLETED
Start: 2019-01-05 | End: 2019-01-05

## 2019-01-05 RX ORDER — 0.9 % SODIUM CHLORIDE 0.9 %
VIAL (ML) INJECTION
Status: DISPENSED
Start: 2019-01-05 | End: 2019-01-05

## 2019-01-05 RX ORDER — LIDOCAINE HYDROCHLORIDE 10 MG/ML
0.5 INJECTION, SOLUTION INFILTRATION; PERINEURAL ONCE AS NEEDED
Status: ACTIVE | OUTPATIENT
Start: 2019-01-05 | End: 2019-01-05

## 2019-01-05 RX ORDER — SODIUM CHLORIDE 0.9 % (FLUSH) 0.9 %
10 SYRINGE (ML) INJECTION AS NEEDED
Status: DISCONTINUED | OUTPATIENT
Start: 2019-01-05 | End: 2019-01-09

## 2019-01-05 NOTE — PROGRESS NOTES
[facility]    Progress Note    Melida Gong Patient Status:  Inpatient    1935 MRN [id number]   Location [facility] 2W/SW Attending Warner Marques MD   Hosp Day # 1 PCP None Pcp        Subjective:     Constitutional: poorly defined hypodensity in the left parietal lobe which may reflect acute or evolving subacute ischemia.      Some improvement today   Neuro following     5- SVT   Better today   b-blocker   H/o CHF with known LVEF 35 %   F/u echo   Cardiology following change since previous study. 2. 0-49% stenosis bilateral ICA.      Dictated by (CST): Kat Oropeza MD on 1/04/2019 at 16:19     Approved by (CST): María Herrera MD on 1/04/2019 at 16:21          Xr Chest Ap Portable  (cpt=71045)    Resu Partially visualized distal esophageal wall thickening. Correlate for potential underlying esophagitis. 3. Nonobstructing right renal calculus. 4. A Lazo catheter is present, decompressing the urinary bladder. 5. Trace bilateral pleural effusions.   6.

## 2019-01-05 NOTE — PROGRESS NOTES
120 [facility] dosing service    Follow-up Pharmacokinetic Consult for Vancomycin Dosing     Nadya Hale is a 81year old male admitted on 1/4  who is being treated for bacteremia. Patient is on day 2 of Vancomycin 1.25 gm IV Q 24 hours.   Goal trough gm IVPB Q 24 hours. 77.6kg weight and renal function)    2. Pharmacy will re-check Vancomycin trough levels prior to 3rd dose as previously ordered. Goal trough level 15-20 ug/mL.     3.  Pharmacy will need BUN/Scr daily while on Vancomycin to assess estelita

## 2019-01-05 NOTE — OCCUPATIONAL THERAPY NOTE
OT orders received, chart reviewed, eval attempted however pt in significant pain per RN Chrissy Casey. Hold from therapy today and resume tomorrow pending pain management. Will continue to follow for care.

## 2019-01-05 NOTE — CONSULTS
[facility]    Report of Consultation    Zaira López Patient Status:  Inpatient    1935 MRN [id number]   Location [facility] 2W/SW Attending Richie Warren MD   Hosp Day # 0 PCP None Pcp     Date of Admission:  1/3/2019 antineoplastic chemotherapy    • Pulmonary embolism (HCC)    • Sepsis ([facility] [address] 1/4/2019   • Skin cancer    • Stroke [facility])    • TIA (transient ischemic attack)    • Visual impairment      Past Surgical History:   Procedure Laterality Date   • CABG     • CATH B atraumatic  No icterus  Positive rhonchi and coarse breath sounds, median sternotomy scar  S1-S2 regular tachycardia  Abdomen bowel sounds present positive tenderness in the epigastric region to gentle palpation, radiation tattoo suprapubic region  Extremi stenosis bilateral ICA. Dictated by (CST): Kleber Richey MD on 1/04/2019 at 16:19     Approved by (CST): Owen Herrera MD on 1/04/2019 at 16:21          Xr Chest Ap Portable  (cpt=71045)    Result Date: 1/4/2019  CONCLUSION:  1.  No wall thickening. Correlate for potential underlying esophagitis. 3. Nonobstructing right renal calculus. 4. A Lazo catheter is present, decompressing the urinary bladder. 5. Trace bilateral pleural effusions.   6. Status post cholecystectomy without sig compromised with sepsis      Neutropenia associated with infection ([facility] [address]  Patient will be treated with filgrastim daily  Profound neutropenia would not likely be secondary to chemotherapy occurring a few days along his last dose of therapy.       Sepsis (HC

## 2019-01-05 NOTE — PHYSICAL THERAPY NOTE
Orders received and chart reviewed. Per RN Sylvia Pallas, hold PT today as pt with high pain levels at this time. Will continue to f/u as appropriate.

## 2019-01-05 NOTE — SLP NOTE
Orders again received for BSE. Per RN, Pt with aggressive coughing with trial of thin liquid and required suctioning after  she presented Pt with controlled amount of water. Daughter requested that Pt not be evaluated for swallow function today.    Daughter

## 2019-01-05 NOTE — PLAN OF CARE
Afebrile  Drowsy, oriented to person and place, follows commands  Sinus rhythm with frequent PAC's; one episode of atrial fibrillation; frequent missed beats but asymptomatic; Amio gtt  NPO; 2 smear loose BM's  Resp status remains unchanged; still has copi

## 2019-01-05 NOTE — CONSULTS
[facility] ID CONSULT NOTE    Aretha Kumari Patient Status:  Inpatient    1935 MRN [id number]   Location [facility] 2W/SW Attending Andres Olguin, Rebeka [facility] Day # 1 PCP None Pcp       Reason for Consultati DISK,ONE LEVEL     • EYE SURGERY     • HERNIA SURGERY       History reviewed. No pertinent family history. reports that  has never smoked. he has never used smokeless tobacco. He reports that he does not drink alcohol or use drugs.     Allergies:    Karsten Snowden (VANCOCIN) 1,500 mg in sodium chloride 0.9 % 500 mL IVPB, 1,500 mg, Intravenous, Q24H    Review of Systems:  ROS limited to clinical status.     Physical Exam:  Vital signs: Blood pressure 128/76, pulse 93, temperature 97.7 °F (36.5 °C), temperature source mask, CXR with cardiomegaly, seen by neurology, CT chest with no PE, CT A/P with esophageal wall thickening, nephrolithiasis with trace pleural effusions, down to 6 L NC, placed on amiodarone per cardiology, started on vancomycin and cefepime, broadened to

## 2019-01-05 NOTE — PROGRESS NOTES
[facility]    Cardiology Progress Note    Taina Barrier Patient Status:  Inpatient    1935 MRN [id number]   Location [facility] 2W/SW Attending Denisse Holden, Rebeka [facility] Day # 1 PCP None Pcp     1/3/2019  Subjective: epis AST  192*   ALB  2.8*       No results for input(s): TROP in the last 168 hours.     Allergies:     Ativan [Lorazepam]      JITTERY    Medications:    Current Facility-Administered Medications:  sodium chloride 0.9% IV bolus 2,583 mL 30 mL/kg Intravenous [facility])      Plan:  Problem #1 SVT. The settings of dehydration and possible infection. No need for repeat echocardiogram last echo reviewed from October 2018 EF 35-40%.   Cont amiodarone drip until swallow eval is completed if passes call cardiology to sta

## 2019-01-05 NOTE — PLAN OF CARE
Problem: RISK FOR INFECTION - ADULT  Goal: Absence of fever/infection during anticipated neutropenic period  INTERVENTIONS  - Monitor WBC  - Administer growth factors as ordered  - Implement neutropenic guidelines  Outcome: Progressing  Patient was positiv

## 2019-01-06 ENCOUNTER — APPOINTMENT (OUTPATIENT)
Dept: GENERAL RADIOLOGY | Facility: HOSPITAL | Age: 84
DRG: 871 | End: 2019-01-06
Attending: INTERNAL MEDICINE
Payer: MEDICARE

## 2019-01-06 LAB
ALBUMIN SERPL BCP-MCNC: 1.7 G/DL (ref 3.5–4.8)
ALBUMIN/GLOB SERPL: 0.7 {RATIO} (ref 1–2)
ALP SERPL-CCNC: 92 U/L (ref 32–100)
ALT SERPL-CCNC: 125 U/L (ref 17–63)
ANION GAP SERPL CALC-SCNC: 9 MMOL/L (ref 0–18)
AST SERPL-CCNC: 102 U/L (ref 15–41)
BASOPHILS # BLD: 0 K/UL (ref 0–0.2)
BASOPHILS NFR BLD: 0 %
BILIRUB SERPL-MCNC: 1.1 MG/DL (ref 0.3–1.2)
BUN SERPL-MCNC: 27 MG/DL (ref 8–20)
BUN/CREAT SERPL: 27.3 (ref 10–20)
CALCIUM SERPL-MCNC: 7.3 MG/DL (ref 8.5–10.5)
CHLORIDE SERPL-SCNC: 118 MMOL/L (ref 95–110)
CO2 SERPL-SCNC: 20 MMOL/L (ref 22–32)
CREAT SERPL-MCNC: 0.99 MG/DL (ref 0.5–1.5)
EOSINOPHIL # BLD: 0 K/UL (ref 0–0.7)
EOSINOPHIL NFR BLD: 0 %
ERYTHROCYTE [DISTWIDTH] IN BLOOD BY AUTOMATED COUNT: 14.6 % (ref 11–15)
GLOBULIN PLAS-MCNC: 2.5 G/DL (ref 2.5–3.7)
GLUCOSE SERPL-MCNC: 126 MG/DL (ref 70–99)
HCT VFR BLD AUTO: 34.1 % (ref 41–52)
HGB BLD-MCNC: 11.1 G/DL (ref 13.5–17.5)
LYMPHOCYTES # BLD: 0.5 K/UL (ref 1–4)
LYMPHOCYTES NFR BLD: 19 %
MCH RBC QN AUTO: 29.1 PG (ref 27–32)
MCHC RBC AUTO-ENTMCNC: 32.5 G/DL (ref 32–37)
MCV RBC AUTO: 89.4 FL (ref 80–100)
METAMYELOCYTES # BLD MANUAL: 0.02 K/UL
MONOCYTES # BLD: 0.2 K/UL (ref 0–1)
MONOCYTES NFR BLD: 7 %
MYELOCYTES NFR BLD: 1 %
NEUTROPHILS # BLD AUTO: 1.7 K/UL (ref 1.8–7.7)
NEUTROPHILS NFR BLD: 51 %
NEUTS BAND NFR BLD: 21 %
OSMOLALITY UR CALC.SUM OF ELEC: 311 MOSM/KG (ref 275–295)
PLATELET # BLD AUTO: 196 K/UL (ref 140–400)
PMV BLD AUTO: 9.5 FL (ref 7.4–10.3)
POTASSIUM SERPL-SCNC: 3.8 MMOL/L (ref 3.3–5.1)
PROT SERPL-MCNC: 4.2 G/DL (ref 5.9–8.4)
RBC # BLD AUTO: 3.81 M/UL (ref 4.5–5.9)
SODIUM SERPL-SCNC: 147 MMOL/L (ref 136–144)
VARIANT LYMPHS NFR BLD MANUAL: 1 %
WBC # BLD AUTO: 2.4 K/UL (ref 4–11)

## 2019-01-06 PROCEDURE — 71045 X-RAY EXAM CHEST 1 VIEW: CPT | Performed by: INTERNAL MEDICINE

## 2019-01-06 PROCEDURE — 99291 CRITICAL CARE FIRST HOUR: CPT | Performed by: INTERNAL MEDICINE

## 2019-01-06 PROCEDURE — 99233 SBSQ HOSP IP/OBS HIGH 50: CPT | Performed by: HOSPITALIST

## 2019-01-06 PROCEDURE — 99232 SBSQ HOSP IP/OBS MODERATE 35: CPT | Performed by: INTERNAL MEDICINE

## 2019-01-06 RX ORDER — POTASSIUM CHLORIDE 14.9 MG/ML
20 INJECTION INTRAVENOUS ONCE
Status: COMPLETED | OUTPATIENT
Start: 2019-01-06 | End: 2019-01-06

## 2019-01-06 RX ORDER — 0.9 % SODIUM CHLORIDE 0.9 %
VIAL (ML) INJECTION
Status: COMPLETED
Start: 2019-01-06 | End: 2019-01-06

## 2019-01-06 NOTE — PROGRESS NOTES
120 [facility] dosing service    Follow-up Pharmacokinetic Consult for Vancomycin Dosing     Taina Carreon is a 81year old male admitted on 1/3 who is being treated for sepsis. Patient is on day 2 of Vancomycin 1.5 gm IV Q 24 hours.   Goal trough is 15 pulmonary embolism to the level of the main pulmonary artery branches. More distal branches are not diagnostically assessed. 2. Diffuse esophageal wall thickening, concerning for potential underlying esophagitis.      3. Postthoracotomy chest with prior

## 2019-01-06 NOTE — PROGRESS NOTES
[facility]    Progress Note    Yg Emery Patient Status:  Inpatient    1935 MRN [id number]   Location [facility] 2W/SW Attending Aleyda Varner MD   Hosp Day # 2 PCP None Pcp        Subjective:     Constitutional:       6- GELN  better  I/O + 11.7 L  Better UO  Cut down IV fluid      7- dvt prophylaxis   Heparin sq      8- full code per family wishes         Critical / ICU status    35 min cct                   Results:     Lab Results   Component Value Date    WBC UndeterminedTachycardia -Old anteroseptal infarct. Non-specific ST-T changes  Low voltage -possible pulmonary disease.  ABNORMAL When compared with ECG of 01/03/2019 21:54:15 No significant changes have occurred Electronically signed on 01/04/2019 at 14:24

## 2019-01-06 NOTE — PLAN OF CARE
CARDIOVASCULAR - ADULT    • Maintains optimal cardiac output and hemodynamic stability Progressing        Delirium    • Minimize duration of delirium Progressing        NEUROLOGICAL - ADULT    • Achieves stable or improved neurological status Progressing

## 2019-01-06 NOTE — SLP NOTE
ADULT SWALLOWING EVALUATION    ASSESSMENT    ASSESSMENT/OVERALL IMPRESSION:  Orders rec'd, chart reviewed. SLP collaborated with RN to see patient for PO trials to assess patient's safety for PO nutrition at this time.  Pt's daughter present for session and NPO. VFSS to follow as appropriate prior to diet recommendations. Collaborated results with RN. FCM score of 2/7.     RECOMMENDATIONS   Diet Recommendations - Solids: NPO  Diet Recommendations - Liquid: NPO    Medication Administration Recommendations: Non- Functional  Symmetry: Within Functional Limits  Strength:  Within Functional Limits  Tone: Within Functional Limits  Range of Motion: Within Functional Limits  Rate of Motion: Reduced    Voice Quality: Weak;Breathy  Respiratory Status: Nasal cannula(5L)  Co

## 2019-01-06 NOTE — PROGRESS NOTES
Double RN skin check done prior to transfer off Unit. Skin check performed by this RN and Padmini Bean. Wounds are as follows: none    Will remain available for any further questions or concerns.

## 2019-01-06 NOTE — OCCUPATIONAL THERAPY NOTE
OCCUPATIONAL THERAPY EVALUATION - INPATIENT     Room Number: 203/203-A  Evaluation Date: 1/6/2019  Type of Evaluation: Initial  Presenting Problem: (dyspnea, SOB, sepsis)    Physician Order: IP Consult to Occupational Therapy  Reason for Therapy: ADL/IADL care/supervision;Sub-acute rehabilitation       PLAN  OT Treatment Plan: Balance activities; Energy conservation/work simplification techniques;ADL training;Functional transfer training; Endurance training;Patient/Family education;Patient/Family training;Equ EXAMINATION      OBJECTIVE  Precautions: Bed/chair alarm;Hard of hearing  Fall Risk: High fall risk    PAIN ASSESSMENT  Rating: Unable to rate  Location: (stomach pain)       ACTIVITY TOLERANCE   /78  HR: 104      O2 SATURATIONS   99%   5L      COGNI reach;RN aware of session/findings; All patient questions and concerns addressed; Alarm set    OT Goals  Patients self stated goal is: not stated     Patient will complete functional toilet transfer with SBA  Comment:     Patient will complete toileting with

## 2019-01-06 NOTE — PROGRESS NOTES
[facility]    Progress Note    Char Sharif Patient Status:  Inpatient    1935 MRN [id number]   Location [facility] 2W/SW Attending Vance Cooper MD   Hosp Day # 1 PCP None Pcp       Char Sharif is a 81 year ol Malignant neoplasm of pancreas, unspecified location of malignancy ([facility] [address]  History of metastatic disease per daughter   S/p 2 doses of paclitaxel + gemcitabine      Altered mental status, unspecified altered mental status type  Improved   Brain MRI pending There are no major discrepancies. Dictated by (CST): Malik Clancy MD on 1/04/2019 at 7:27     Approved by (CST): Malik Clancy MD on 1/04/2019 at 7:31          Us Carotid Doppler Bilat - Diag Img (cpt=93880)    Result Date: 1/4/2019  CONCLUSION:  1. Ramesh Gusman MD on 1/04/2019 at 11:31          Ct Abdomen Pelvis Iv Contrast, No Oral (er)    Result Date: 1/4/2019  CONCLUSION:  1. No distinct pancreatic lesion is detected.  If clinically warranted, followup MR of the abdomen with and without contrast can be infarct. Non-specific ST-T changes  Low voltage -possible pulmonary disease.  ABNORMAL When compared with ECG of 10/20/2018 15:08:03 Rate has increased, ST changes are more prominent Electronically signed on 01/04/2019 at 14:23 by Romi Denise MD

## 2019-01-06 NOTE — PROGRESS NOTES
[facility]  Progress Note     Aline Ramirez  : 1935    Status: Inpatient  Day #: 2    Attending: Bhavesh Foss MD  PCP: None Pcp      Assessment and Plan     Severe Sepsis with staph hominis bacteremia - had hypotension requiring pr ml   Output 850 ml   Net 3263.19 ml         Recent Labs   Lab  01/03/19   2158  01/05/19   0447  01/06/19   0557   WBC  1.1*  2.9*  2.4*   HGB  16.2  11.3*  11.1*   HCT  49.3  33.8*  34.1*   PLT  557*  270  196   RBC  5.52  3.80*  3.81*   MCV  89.3  88.9 12-lead    Result Date: 1/4/2019  ECG Report  Interpretation  -------------------------- Supraventricular Tachycardia All QRSs narrow and no P waves, Diffuse low voltage.  -Old inferior infarct -Old anteroseptal infarct.  -Nonspecific ST depression + Nonsp

## 2019-01-06 NOTE — PHYSICAL THERAPY NOTE
PHYSICAL THERAPY EVALUATION - INPATIENT     Room Number: 345/345-A  Evaluation Date: 1/6/2019  Type of Evaluation: Initial           Reason for Therapy: Mobility Dysfunction and Discharge Planning    PHYSICAL THERAPY ASSESSMENT     Patient is a 81 year ol insufficiency    Altered mental status, unspecified altered mental status type    Neutropenia associated with infection ([facility] [address]    Sepsis [facility])      Past Medical History  Past Medical History:   Diagnosis Date   • Atherosclerosis of coronary artery    • Back bedclothes, sheets and blankets)?: A Little   -   Sitting down on and standing up from a chair with arms (e.g., wheelchair, bedside commode, etc.): A Lot   -   Moving from lying on back to sitting on the side of the bed?: A Lot   How much help from another

## 2019-01-06 NOTE — PLAN OF CARE
Problem: Safety Risk - Non-Violent Restraints  Goal: Patient will remain free from self-harm  INTERVENTIONS:  - Apply the least restrictive restraint to prevent harm  - Notify patient and family of reasons restraints applied  - Assess for any contributing precautions, WBC up to 2.9. His blood cultures are positive for Gram positive cocci in clusters and she discontinued the Merem and started Cefipime and Vancomycin is continued.       Problem: SAFETY ADULT - FALL  Goal: Free from fall injury  INTERVENTIONS: facilitate oxygenation and minimize respiratory effort  - Oxygen supplementation based on oxygen saturation or ABGs  - Provide Smoking Cessation handout, if applicable  - Encourage broncho-pulmonary hygiene including cough, deep breathe, Incentive Spiromet

## 2019-01-06 NOTE — PLAN OF CARE
Problem: PAIN - ADULT  Goal: Verbalizes/displays adequate comfort level or patient's stated pain goal  INTERVENTIONS:  - Encourage pt to monitor pain and request assistance  - Assess pain using appropriate pain scale  - Administer analgesics based on type light for assistance, verbalized understanding. Call light within pt's reach. Side rails up x 3 for added safety precaution.     Problem: CARDIOVASCULAR - ADULT  Goal: Maintains optimal cardiac output and hemodynamic stability  INTERVENTIONS:  - Monitor vit breakdown  - Initiate interventions, skin care algorithm/standards of care as needed  Outcome: Progressing  Pt's skin observed intact, able to turn self. Pt with sacral mepilex dressing in place for added skin protection.     Problem: NEUROLOGICAL - ADULT

## 2019-01-06 NOTE — PLAN OF CARE
Pulmonary (Alejandra): decrease IVF, continue antibiotics   Cardiology [facility]): maintain amio gtt until swallow eval - will transition to oral     Patient remains alert/oriented x4 throughout shift, although hard of hearing and delayed speech.  Pain controll

## 2019-01-06 NOTE — PROGRESS NOTES
[facility]    Cardiology Progress Note    Nestor Torres Patient Status:  Inpatient    1935 MRN [id number]   Location [facility] 2W/SW Attending Jerica Starr MD   Hosp Day # 2 PCP None Pcp     1/3/2019  Subjective: no e 1. 15  0.99   CA  8.9  7.6*  7.3*  7.2*  7.3*   MG  2.1  1.8  2.2   --    GLU  206*  190*  131*  139*  126*       Recent Labs   Lab  01/03/19   2158  01/05/19   0447  01/06/19   0557   ALT  200*  117*  125*   AST  192*  86*  102*   ALB  2.8*  1.6*  1.7* Acute CVA (cerebrovascular accident) ([facility] [address]     Acute ischemic stroke ([facility] [address]     Acute kidney injury ([facility] [address]     Hyperglycemia     Dehydration     Malignant neoplasm of pancreas, unspecified location of malignancy ([facility] [address]     Renal insufficiency     Altered ment

## 2019-01-06 NOTE — PROGRESS NOTES
[facility]    Progress Note    Yesica Lowe Patient Status:  Inpatient    1935 MRN [id number]   Location [facility] 2W/SW Attending Stu Rinaldi, [address] [facility] Day # 1 PCP None Pcp       Subjective:   Yesica Lowe is injection 10 mg 10 mg Intravenous Q4H PRN   0.9%  NaCl infusion  Intravenous Continuous   Pantoprazole Sodium (PROTONIX) 40 mg in Sodium Chloride 0.9 % 10 mL IV push 40 mg Intravenous Q24H   Vancomycin HCl (FIRVANQ) 50 MG/ML oral solution 125 mg 125 mg Ora ischemia. Consider MRI for further evaluation. 2. Senescent changes of parenchymal volume loss with sequela of chronic microvascular ischemic disease. 3. There is large vessel atherosclerosis of the anterior and posterior intracranial circulations. 4.  L remote, healed right-sided lateral and anterolateral rib fracture deformities. 5. Sequela of remote granulomatous disease. 6. Lesser incidental findings as above. A preliminary report was issued by the [address] [place] [address] Radiology teleradiology service.  There are infarct. Non-specific ST-T changes  Low voltage -possible pulmonary disease.  ABNORMAL When compared with ECG of 01/03/2019 21:54:15 No significant changes have occurred Electronically signed on 01/04/2019 at 14:24 by Cl Gandara MD    Ekg 12-lead

## 2019-01-07 ENCOUNTER — APPOINTMENT (OUTPATIENT)
Dept: GENERAL RADIOLOGY | Facility: HOSPITAL | Age: 84
DRG: 871 | End: 2019-01-07
Attending: HOSPITALIST
Payer: MEDICARE

## 2019-01-07 PROBLEM — R78.81 BACTEREMIA: Status: ACTIVE | Noted: 2019-01-07

## 2019-01-07 LAB
ANION GAP SERPL CALC-SCNC: 9 MMOL/L (ref 0–18)
BASOPHILS # BLD: 0 K/UL (ref 0–0.2)
BASOPHILS NFR BLD: 0 %
BUN SERPL-MCNC: 29 MG/DL (ref 8–20)
BUN/CREAT SERPL: 28.7 (ref 10–20)
C DIFF TOX B STL QL: NEGATIVE
CALCIUM SERPL-MCNC: 7.3 MG/DL (ref 8.5–10.5)
CHLORIDE SERPL-SCNC: 117 MMOL/L (ref 95–110)
CO2 SERPL-SCNC: 19 MMOL/L (ref 22–32)
CREAT SERPL-MCNC: 1.01 MG/DL (ref 0.5–1.5)
EOSINOPHIL # BLD: 0 K/UL (ref 0–0.7)
EOSINOPHIL NFR BLD: 0 %
ERYTHROCYTE [DISTWIDTH] IN BLOOD BY AUTOMATED COUNT: 14.7 % (ref 11–15)
GLUCOSE SERPL-MCNC: 134 MG/DL (ref 70–99)
HCT VFR BLD AUTO: 35.1 % (ref 41–52)
HGB BLD-MCNC: 11.5 G/DL (ref 13.5–17.5)
LYMPHOCYTES # BLD: 1.3 K/UL (ref 1–4)
LYMPHOCYTES NFR BLD: 15 %
MCH RBC QN AUTO: 29 PG (ref 27–32)
MCHC RBC AUTO-ENTMCNC: 32.7 G/DL (ref 32–37)
MCV RBC AUTO: 88.6 FL (ref 80–100)
METAMYELOCYTES # BLD MANUAL: 0.09 K/UL
METAMYELOCYTES # BLD MANUAL: 0.26 K/UL
METAMYELOCYTES NFR BLD: 3 %
MONOCYTES # BLD: 1.1 K/UL (ref 0–1)
MONOCYTES NFR BLD: 13 %
MYELOCYTES NFR BLD: 1 %
NEUTROPHILS # BLD AUTO: 5.9 K/UL (ref 1.8–7.7)
NEUTROPHILS NFR BLD: 44 %
NEUTS BAND NFR BLD: 24 %
OSMOLALITY UR CALC.SUM OF ELEC: 308 MOSM/KG (ref 275–295)
PLATELET # BLD AUTO: 118 K/UL (ref 140–400)
PMV BLD AUTO: 9.2 FL (ref 7.4–10.3)
POTASSIUM SERPL-SCNC: 3.7 MMOL/L (ref 3.3–5.1)
POTASSIUM SERPL-SCNC: 3.7 MMOL/L (ref 3.3–5.1)
RBC # BLD AUTO: 3.97 M/UL (ref 4.5–5.9)
SODIUM SERPL-SCNC: 145 MMOL/L (ref 136–144)
T3 SERPL-MCNC: 0.24 NG/ML (ref 0.87–1.78)
T4 FREE SERPL-MCNC: 1.09 NG/DL (ref 0.58–1.64)
TSH SERPL-ACNC: 0.08 UIU/ML (ref 0.45–5.33)
WBC # BLD AUTO: 8.6 K/UL (ref 4–11)

## 2019-01-07 PROCEDURE — 74230 X-RAY XM SWLNG FUNCJ C+: CPT | Performed by: HOSPITALIST

## 2019-01-07 PROCEDURE — 99233 SBSQ HOSP IP/OBS HIGH 50: CPT | Performed by: INTERNAL MEDICINE

## 2019-01-07 PROCEDURE — 71045 X-RAY EXAM CHEST 1 VIEW: CPT | Performed by: HOSPITALIST

## 2019-01-07 PROCEDURE — F00ZJWZ INSTRUMENTAL SWALLOWING AND ORAL FUNCTION ASSESSMENT USING SWALLOWING EQUIPMENT: ICD-10-PCS | Performed by: RADIOLOGY

## 2019-01-07 PROCEDURE — 99233 SBSQ HOSP IP/OBS HIGH 50: CPT | Performed by: HOSPITALIST

## 2019-01-07 RX ORDER — 0.9 % SODIUM CHLORIDE 0.9 %
VIAL (ML) INJECTION
Status: COMPLETED
Start: 2019-01-07 | End: 2019-01-07

## 2019-01-07 RX ORDER — POTASSIUM CHLORIDE 14.9 MG/ML
20 INJECTION INTRAVENOUS ONCE
Status: COMPLETED | OUTPATIENT
Start: 2019-01-07 | End: 2019-01-07

## 2019-01-07 RX ORDER — LOPERAMIDE HYDROCHLORIDE 2 MG/1
2 CAPSULE ORAL 4 TIMES DAILY PRN
Status: DISCONTINUED | OUTPATIENT
Start: 2019-01-07 | End: 2019-01-09

## 2019-01-07 RX ORDER — PIPERACILLIN SODIUM, TAZOBACTAM SODIUM 3; .375 G/15ML; G/15ML
3.38 INJECTION, POWDER, LYOPHILIZED, FOR SOLUTION INTRAVENOUS EVERY 8 HOURS
Qty: 121.5 G | Refills: 0 | Status: SHIPPED | OUTPATIENT
Start: 2019-01-07 | End: 2019-01-19

## 2019-01-07 NOTE — PROGRESS NOTES
[facility]  Progress Note     Zaira López  : 1935    Status: Inpatient  Day #: 3    Attending: Dilip Paulino MD  PCP: None Pcp      Assessment and Plan     Severe Sepsis with staph hominis bacteremia - had hypotension requiring pr swelling  Extremities:  +edema.    Neurologic:  nonfocal  Psychiatric:  Normal affect, calm and appropriate  Skin:  No rash, no lesion    Intake/Output Summary (Last 24 hours) at 1/7/2019 1440  Last data filed at 1/7/2019 1420  Gross per 24 hour   Intake 14 right pleural effusions with no significant change. Atelectasis or less likely pneumonia in the left lower lobe. 2. Old right rib fractures. 3. Stable cardiomegaly. Post coronary bypass. 4. Atherosclerotic calcification aorta.  5. Right PICC line with tip i

## 2019-01-07 NOTE — PROGRESS NOTES
[facility] HOSP - [facility]    Progress Note    Edie Diaz Patient Status:  Inpatient    1935 MRN [id number]   Location [facility] 3W/SW Attending Clark Barrera MD   Hosp Day # 2 PCP None Pcp       Edie Diaz is a 81year old mal the medical power of .         Results:     Lab Results   Component Value Date    WBC 2.4 (L) 01/06/2019    HGB 11.1 (L) 01/06/2019    HCT 34.1 (L) 01/06/2019     01/06/2019    CREATSERUM 0.99 01/06/2019    BUN 27 (H) 01/06/2019     (H)

## 2019-01-07 NOTE — PROGRESS NOTES
[facility] ID PROGRESS NOTE    Taina Barrier Patient Status:  Inpatient    1935 MRN [id number]   Location [facility] 3W/SW Attending Sharona Abbott MD   Hosp Day # 3 PCP None Pcp     Subjective:  Afebrile.  Had diarrhea yesterday, CDPCR negative th cefepime  meropenem     81year old male with a history of CAD s/p CABG, gout, skin cancer, HTN, h/o stroke, recent diagnosis of pancreatic cancer, follows at [facility] s/p recent chemo, last chemo on Monday, who presented to the [address] [place] [address] ED on 1/3 with altered

## 2019-01-07 NOTE — DIETARY NOTE
ADULT NUTRITION INITIAL ASSESSMENT    Pt is at moderate nutrition risk. Pt meets malnutrition criteria.       CRITERIA FOR MALNUTRITION DIAGNOSIS:  Criteria for non-severe malnutrition diagnosis: chronic illness related to energy intake less than75% for gr coronary artery    • Back problem    • Bacteremia 1/7/2019   • CHF (congestive heart failure) (HCC)     ef 35%   • Essential hypertension    • Exposure to medical diagnostic radiation    • Gout    • Hearing impairment    • History of blood transfusion    • 2.2   --    --    NA  140  141  142  147*  145*   K  4.1  3.8  3.9  3.8  3.7  3.7   CL  111*  112*  113*  118*  117*   CO2  19*  20*  22  20*  19*   OSMOCALC  304*  301*  303*  311*  308*     NUTRITION RELATED PHYSICAL FINDINGS:  - Body Fat/Muscle Mass: we

## 2019-01-07 NOTE — PROGRESS NOTES
Pulmonary/Critical Care Follow Up Note    HPI:   Mery Betts is a 81year old male with Patient presents with:  Dyspnea HANNAH SOB (respiratory)      PCP None Pcp  Admission Attending Monty Amaya Day #3    Pt non verbal    PMH:  Past (ZARXIO) 480 MCG/0.8ML injection SOSY 480 mcg, 480 mcg, Subcutaneous, [email or web address]  •  [COMPLETED] Amiodarone HCl (CORDARONE) 150 mg in dextrose 5 % 100 mL bolus, 150 mg, Intravenous, Once **FOLLOWED BY** [] Amiodarone HCl (CORDARONE) 450 mg in dextro sepsis  Better  Dtr notes pt just received morphine  Plan      ASA   follow     SVT  Symptomatic   Converted with adenosine  Plan tele    Cards following     Sepsis  Neutropenic fever  Bld cx + s hominis  Plan IV abx x 2 weeks   ID following

## 2019-01-07 NOTE — PROGRESS NOTES
[facility]    Progress Note    Ximena Sotomayor Patient Status:  Inpatient    1935 MRN [id number]   Location [facility] 3W/SW Attending Katt Comer MD   Hosp Day # 3 PCP None Pcp         Assessment and Plan:    SVT.   in [facility] Q18H   • cefepime  2 g Intravenous Q8H   • Heparin Sodium (Porcine)  5,000 Units Subcutaneous Q12H   • pantoprazole (PROTONIX) IV push  40 mg Intravenous Q24H   • Vancomycin HCl  125 mg Oral Daily   • metoprolol Tartrate  5 mg Intravenous Q4H   • filgrasti

## 2019-01-07 NOTE — PHYSICAL THERAPY NOTE
PHYSICAL THERAPY TREATMENT NOTE - INPATIENT     Room Number: 345/345-A            Problem List  Principal Problem:    Dehydration  Active Problems:    Acute kidney injury ([facility] [address]    Hyperglycemia    Malignant neoplasm of pancreas, unspecified location of mal adjusting bedclothes, sheets and blankets)?: A Little   -   Sitting down on and standing up from a chair with arms (e.g., wheelchair, bedside commode, etc.): A Lot   -   Moving from lying on back to sitting on the side of the bed?: A Lot   How much help fr

## 2019-01-07 NOTE — CM/SW NOTE
Addendum 3:51pm    RACHAEL informed by the [facility] that the only way that the pt can received IV abx with C is for the pt to be transferred to [facility]. The pt's infusion center RN is Viviana Conde [phone] x [phone].  RACHAEL spoke with Micheline Leonard who con with ADLs, ambulates with a walker and cane, and dose not drive. Pt requires assistance with bathing, dressing, meals, medication management, and driving.      Luisa states that the pt is currently receiving palliative services from [facility] . RACHAEL sierra

## 2019-01-08 LAB
ANION GAP SERPL CALC-SCNC: 10 MMOL/L (ref 0–18)
BASOPHILS # BLD: 0.2 K/UL (ref 0–0.2)
BASOPHILS NFR BLD: 1 %
BUN SERPL-MCNC: 30 MG/DL (ref 8–20)
BUN/CREAT SERPL: 24.2 (ref 10–20)
CALCIUM SERPL-MCNC: 6.7 MG/DL (ref 8.5–10.5)
CHLORIDE SERPL-SCNC: 123 MMOL/L (ref 95–110)
CO2 SERPL-SCNC: 16 MMOL/L (ref 22–32)
CREAT SERPL-MCNC: 1.24 MG/DL (ref 0.5–1.5)
EOSINOPHIL # BLD: 0 K/UL (ref 0–0.7)
EOSINOPHIL NFR BLD: 0 %
ERYTHROCYTE [DISTWIDTH] IN BLOOD BY AUTOMATED COUNT: 15.1 % (ref 11–15)
GLUCOSE SERPL-MCNC: 210 MG/DL (ref 70–99)
HCT VFR BLD AUTO: 32.5 % (ref 41–52)
HGB BLD-MCNC: 10.5 G/DL (ref 13.5–17.5)
LYMPHOCYTES # BLD: 3 K/UL (ref 1–4)
LYMPHOCYTES NFR BLD: 15 %
MCH RBC QN AUTO: 28.6 PG (ref 27–32)
MCHC RBC AUTO-ENTMCNC: 32.2 G/DL (ref 32–37)
MCV RBC AUTO: 88.7 FL (ref 80–100)
METAMYELOCYTES # BLD MANUAL: 0.6 K/UL
MONOCYTES # BLD: 0.6 K/UL (ref 0–1)
MONOCYTES NFR BLD: 3 %
MYELOCYTES NFR BLD: 3 %
NEUTROPHILS # BLD AUTO: 15.6 K/UL (ref 1.8–7.7)
NEUTROPHILS NFR BLD: 68 %
NEUTS BAND NFR BLD: 10 %
NRBC BLD-RTO: 2 % (ref ?–1)
OSMOLALITY UR CALC.SUM OF ELEC: 320 MOSM/KG (ref 275–295)
PLATELET # BLD AUTO: 83 K/UL (ref 140–400)
PMV BLD AUTO: 10.5 FL (ref 7.4–10.3)
POTASSIUM SERPL-SCNC: 3.5 MMOL/L (ref 3.3–5.1)
POTASSIUM SERPL-SCNC: 3.5 MMOL/L (ref 3.3–5.1)
RBC # BLD AUTO: 3.67 M/UL (ref 4.5–5.9)
SODIUM SERPL-SCNC: 149 MMOL/L (ref 136–144)
WBC # BLD AUTO: 20 K/UL (ref 4–11)

## 2019-01-08 PROCEDURE — 99233 SBSQ HOSP IP/OBS HIGH 50: CPT | Performed by: HOSPITALIST

## 2019-01-08 RX ORDER — HYDRALAZINE HYDROCHLORIDE 20 MG/ML
10 INJECTION INTRAMUSCULAR; INTRAVENOUS EVERY 4 HOURS PRN
Qty: 1 VIAL | Refills: 0 | Status: SHIPPED
Start: 2019-01-08

## 2019-01-08 RX ORDER — POTASSIUM CHLORIDE 29.8 MG/ML
40 INJECTION INTRAVENOUS ONCE
Status: COMPLETED | OUTPATIENT
Start: 2019-01-08 | End: 2019-01-08

## 2019-01-08 RX ORDER — SODIUM CHLORIDE 9 MG/ML
INJECTION, SOLUTION INTRAVENOUS
Status: COMPLETED
Start: 2019-01-08 | End: 2019-01-08

## 2019-01-08 RX ORDER — 0.9 % SODIUM CHLORIDE 0.9 %
VIAL (ML) INJECTION
Status: COMPLETED
Start: 2019-01-08 | End: 2019-01-08

## 2019-01-08 RX ORDER — ACETAMINOPHEN 10 MG/ML
1000 INJECTION, SOLUTION INTRAVENOUS EVERY 6 HOURS PRN
Status: DISCONTINUED | OUTPATIENT
Start: 2019-01-08 | End: 2019-01-09

## 2019-01-08 RX ORDER — METOPROLOL TARTRATE 5 MG/5ML
5 INJECTION INTRAVENOUS EVERY 4 HOURS
Qty: 1 VIAL | Refills: 0 | Status: SHIPPED
Start: 2019-01-08

## 2019-01-08 RX ORDER — DEXTROSE AND SODIUM CHLORIDE 5; .45 G/100ML; G/100ML
75 INJECTION, SOLUTION INTRAVENOUS CONTINUOUS
Qty: 1 CONTAINER | Refills: 0 | Status: SHIPPED
Start: 2019-01-08

## 2019-01-08 RX ORDER — ACETAMINOPHEN 10 MG/ML
1000 INJECTION, SOLUTION INTRAVENOUS EVERY 6 HOURS PRN
Qty: 1 VIAL | Refills: 0 | Status: SHIPPED
Start: 2019-01-08

## 2019-01-08 RX ORDER — HEPARIN SODIUM 5000 [USP'U]/ML
5000 INJECTION, SOLUTION INTRAVENOUS; SUBCUTANEOUS EVERY 12 HOURS
Qty: 1 VIAL | Refills: 0 | Status: SHIPPED
Start: 2019-01-08 | End: 2019-01-09

## 2019-01-08 RX ORDER — DEXTROSE AND SODIUM CHLORIDE 5; .45 G/100ML; G/100ML
INJECTION, SOLUTION INTRAVENOUS CONTINUOUS
Status: DISCONTINUED | OUTPATIENT
Start: 2019-01-08 | End: 2019-01-09

## 2019-01-08 RX ORDER — ONDANSETRON 2 MG/ML
4 INJECTION INTRAMUSCULAR; INTRAVENOUS EVERY 6 HOURS PRN
Qty: 1 VIAL | Refills: 0 | Status: SHIPPED
Start: 2019-01-08

## 2019-01-08 NOTE — SLP NOTE
ADULT VIDEOFLUOROSCOPIC SWALLOWING STUDY    Admission Date: 1/3/2019  Evaluation Date: 01/07/19  Radiologist: Javier    RECOMMENDATIONS   Diet Recommendations - Solids: NPO  Diet Recommendations - Liquid: NPO    Further Follow-up:  Follow Up Needed:  Angelito Conde assess oropharyngeal swallow function and assess for compensatory strategies to improve safe swallow function. NECTAR THICK LIQUIDS  Method of Presentation: Teaspoon  Oral Phase of Swallow (VFSS - Nectar Thick Liquids):  Impaired  Bolus Retrieval (VFS dysphagia characterized by decreased oral awareness and initiation of bolus formation/a-p propulsion with intermittently absent pharyngeal response. Premature spillage to the pyriform sinus on all consistencies.  Significant pooling after the swallow result

## 2019-01-08 NOTE — PROGRESS NOTES
[facility]    Progress Note    Jennifer Parks Patient Status:  Inpatient    1935 MRN [id number]   Location [facility] 3W/SW Attending Ruben Kumar MD   Hosp Day # 4 PCP None Pcp        Subjective:     Unable to perform ROS 1.09 01/05/2019    TSH 0.08 (L) 01/05/2019    LIP 16 (L) 01/03/2019    MG 2.2 01/05/2019    TROP 0.00 10/20/2018       Xr Video Swallow (cpt=74230)    Result Date: 1/7/2019  CONCLUSION:  1. Aspiration demonstrated.      Dictated by (CST): José Formica

## 2019-01-08 NOTE — PROGRESS NOTES
Oncology   Discussed status with two daughters at patient's bedside. Patient sedated and unresponsive. JOEY obtained from daughter Stephanie with a copy of the patient's POA.   CD from the [place] provided by daughter and sent to radiology to be added to [facility] for c

## 2019-01-08 NOTE — SLP NOTE
Per RN, Pt presented with lethargy and reduced alertness. Not appropriate for oral trials for bedside swallow assessment at this time. Discussed with RN plan for speech to f/u on 1/09/19.       Jose Reed M.S. FANG/SLP  Speech-Language Pathologis

## 2019-01-08 NOTE — PROGRESS NOTES
[facility] ID PROGRESS NOTE    Melida Gong Patient Status:  Inpatient    1935 MRN [id number]   Location [facility] 3W/SW Attending Macrina Stanton MD   Hosp Day # 4 PCP None Pcp     Subjective:  Afebrile.  Family at bedside reports he is still con Bacteremia      ASSESSMENT:    Antibiotics: Zosyn  Meropenem, vancomycin, cefepime     81year old male with a history of CAD s/p CABG, gout, skin cancer, HTN, h/o stroke, recent diagnosis of pancreatic cancer, follows at [facility] s/p recent chemo, last ch imaging reports.  -  Case d/w patient, patient's daughter, RN.     Chela Alatorre PA-C  [facility]  [phone]  1/7/2019

## 2019-01-08 NOTE — PROGRESS NOTES
[facility]  Progress Note     Yesica Lowe  : 1935    Status: Inpatient  Day #: 4    Attending: Alva Regan MD  PCP: None Pcp     **patient may be transferring to [place] [address] and Plan     Severe Sepsis with staph homi Temp:  [98.5 °F (36.9 °C)-98.6 °F (37 °C)] 98.6 °F (37 °C)  Pulse:  [] 88  Resp:  [19-22] 20  BP: (111-134)/(75-78) 134/75  General:  Alert, no distress  HEENT:  Normocephalic, atraumatic  Neck:  Supple, symmetrical  Cardiac:  Regular rate, regul 16*   --    --    --    --    --    --    TSH   --    --    --   0.08*   --    --    --    T4F   --    --    --   1.09   --    --    --        Xr Video Swallow (cpt=74230)    Result Date: 1/7/2019  CONCLUSION:  1. Aspiration demonstrated.      Dictated by (

## 2019-01-08 NOTE — CM/SW NOTE
Addendum 3:50pm    Perlita informed by Leda Ryder from [facility] [phone] that SW needs to faxed updated clinicals tomorrow morning and she will process the transfer at that time.      Addendum 3:35pm    PERLITA spoke with Dr. Real Almaguer who stated that the pt is medically stabl

## 2019-01-09 VITALS
BODY MASS INDEX: 29 KG/M2 | SYSTOLIC BLOOD PRESSURE: 128 MMHG | DIASTOLIC BLOOD PRESSURE: 67 MMHG | TEMPERATURE: 98 F | HEART RATE: 82 BPM | RESPIRATION RATE: 20 BRPM | OXYGEN SATURATION: 97 % | WEIGHT: 203 LBS

## 2019-01-09 LAB
ANION GAP SERPL CALC-SCNC: 8 MMOL/L (ref 0–18)
BASOPHILS # BLD: 0 K/UL (ref 0–0.2)
BASOPHILS NFR BLD: 0 %
BUN SERPL-MCNC: 33 MG/DL (ref 8–20)
BUN/CREAT SERPL: 24.3 (ref 10–20)
CALCIUM SERPL-MCNC: 7.2 MG/DL (ref 8.5–10.5)
CHLORIDE SERPL-SCNC: 124 MMOL/L (ref 95–110)
CO2 SERPL-SCNC: 20 MMOL/L (ref 22–32)
CREAT SERPL-MCNC: 1.36 MG/DL (ref 0.5–1.5)
EOSINOPHIL # BLD: 0 K/UL (ref 0–0.7)
EOSINOPHIL NFR BLD: 0 %
ERYTHROCYTE [DISTWIDTH] IN BLOOD BY AUTOMATED COUNT: 15.2 % (ref 11–15)
GLUCOSE SERPL-MCNC: 158 MG/DL (ref 70–99)
HCT VFR BLD AUTO: 30.6 % (ref 41–52)
HGB BLD-MCNC: 9.8 G/DL (ref 13.5–17.5)
LYMPHOCYTES # BLD: 1.2 K/UL (ref 1–4)
LYMPHOCYTES NFR BLD: 5 %
MAGNESIUM SERPL-MCNC: 2 MG/DL (ref 1.8–2.5)
MCH RBC QN AUTO: 28.5 PG (ref 27–32)
MCHC RBC AUTO-ENTMCNC: 32.1 G/DL (ref 32–37)
MCV RBC AUTO: 88.9 FL (ref 80–100)
METAMYELOCYTES # BLD MANUAL: 0.23 K/UL
METAMYELOCYTES NFR BLD: 1 %
MONOCYTES # BLD: 1.6 K/UL (ref 0–1)
MONOCYTES NFR BLD: 7 %
NEUTROPHILS # BLD AUTO: 20.3 K/UL (ref 1.8–7.7)
NEUTROPHILS NFR BLD: 71 %
NEUTS BAND NFR BLD: 16 %
OSMOLALITY UR CALC.SUM OF ELEC: 325 MOSM/KG (ref 275–295)
PLATELET # BLD AUTO: 66 K/UL (ref 140–400)
PMV BLD AUTO: 9.7 FL (ref 7.4–10.3)
POTASSIUM SERPL-SCNC: 3.6 MMOL/L (ref 3.3–5.1)
POTASSIUM SERPL-SCNC: 3.6 MMOL/L (ref 3.3–5.1)
RBC # BLD AUTO: 3.44 M/UL (ref 4.5–5.9)
SODIUM SERPL-SCNC: 152 MMOL/L (ref 136–144)
WBC # BLD AUTO: 23.4 K/UL (ref 4–11)

## 2019-01-09 PROCEDURE — 99239 HOSP IP/OBS DSCHRG MGMT >30: CPT | Performed by: HOSPITALIST

## 2019-01-09 RX ORDER — POTASSIUM CHLORIDE 29.8 MG/ML
40 INJECTION INTRAVENOUS ONCE
Status: COMPLETED | OUTPATIENT
Start: 2019-01-09 | End: 2019-01-09

## 2019-01-09 RX ORDER — KETOROLAC TROMETHAMINE 30 MG/ML
30 INJECTION, SOLUTION INTRAMUSCULAR; INTRAVENOUS EVERY 6 HOURS PRN
Status: DISCONTINUED | OUTPATIENT
Start: 2019-01-09 | End: 2019-01-09

## 2019-01-09 RX ORDER — 0.9 % SODIUM CHLORIDE 0.9 %
VIAL (ML) INJECTION
Status: COMPLETED
Start: 2019-01-09 | End: 2019-01-09

## 2019-01-09 RX ORDER — KETOROLAC TROMETHAMINE 30 MG/ML
30 INJECTION, SOLUTION INTRAMUSCULAR; INTRAVENOUS EVERY 6 HOURS PRN
Qty: 10 ML | Refills: 0 | Status: SHIPPED | OUTPATIENT
Start: 2019-01-09

## 2019-01-09 NOTE — PLAN OF CARE
CARDIOVASCULAR - ADULT    • Maintains optimal cardiac output and hemodynamic stability Not Progressing        Delirium    • Minimize duration of delirium Not Progressing        RISK FOR INFECTION - ADULT    • Absence of fever/infection during anticipated n

## 2019-01-09 NOTE — CM/SW NOTE
Updated clinicals faxed to [facility] ([phone]) per request of Franki Crockett, . Spoke w/ Josh Short at [facility] ([phone]) and notified of the above.    Davian Anne, [address] [place] [address]     3504 - Page out to  for update on status of t

## 2019-01-09 NOTE — PROGRESS NOTES
[facility]    Progress Note    Cassie Spears Patient Status:  Inpatient    1935 MRN [id number]   Location [facility] 3W/SW Attending Agustin Morales MD   [address] Day # 4 PCP None Pcp       Cassie Spears is a 81year old mal fine-needle aspiration. Altered mental status, unspecified altered mental status type  May be related to the patient's recent CVA. Reduction of his pain medication did result in an improvement in his mental status earlier today per the nursing staff. MD on 1/07/2019 at 8:38                       Fanny [place] L. Lynnetta Favre, MD  1/8/2019

## 2019-01-09 NOTE — OCCUPATIONAL THERAPY NOTE
Attempted to see pt for OT session, however pt very drowsy and declined several times for EOB and OOB activities. Dtr from [place] present and reports he was up most of the morning.  She was educated on exercises for BUE to perform with pt when he is more nona

## 2019-01-09 NOTE — PROGRESS NOTES
[facility] ID PROGRESS NOTE    Vinnie Cormier Patient Status:  Inpatient    1935 MRN [id number]   Location [facility] 3W/SW Attending Natalio Sloan MD   Hosp Day # 5 PCP None Pcp     Subjective:  Afebrile.  Family states he is much more alert, now Bella  Meropenem, vancomycin, cefepime     81year old male with a history of CAD s/p CABG, gout, skin cancer, HTN, h/o stroke, recent diagnosis of pancreatic cancer, follows at [facility] s/p recent chemo, last chemo on Monday, who presented to the [address] [place] [address] ED o Consultants  [phone]  1/7/2019

## 2019-01-09 NOTE — PROGRESS NOTES
[place] [facility] - [facility]    Progress Note    Vinnie Cormier Patient Status:  Inpatient    1935 MRN [id number]   Location [facility] 3W/SW Attending Natalio Sloan MD   Hosp Day # 5 PCP None Pcp        Subjective:     Unable to perform ROS  (H) 01/06/2019    PTT 26.3 10/20/2018    INR 1.1 10/20/2018    T4F 1.09 01/05/2019    TSH 0.08 (L) 01/05/2019    LIP 16 (L) 01/03/2019    MG 2.0 01/09/2019    TROP 0.00 10/20/2018                      JONN Dallas  S Cardiology

## 2019-01-09 NOTE — PLAN OF CARE
Delirium    • Minimize duration of delirium Not Progressing        RESPIRATORY - ADULT    • Achieves optimal ventilation and oxygenation Not Progressing    Still on 7L of HFNC.       RISK FOR INFECTION - ADULT    • Absence of fever/infection during anticipa

## 2019-01-09 NOTE — SLP NOTE
ADULT SWALLOWING Re-EVALUATION    ASSESSMENT    ASSESSMENT/OVERALL IMPRESSION:  RN contacted this SLP in PM regarding patient's POC. Pt known to SLP department with limited VFSS completed 1/8/19.  At that time, pt alert and able to participate in PO trials However, pt's reliability of feedback at this time is unknown given current cognitive status.    Pt presents with moderate oral dysphagia with decreased oral acceptance and loss of bolus with reduced lingual initiation with probable pharyngeal dysfunction w Aspiration    Patient/Family Goals: Resume PO nutrition. SWALLOWING HISTORY  Current Diet Consistency: NPO  Dysphagia History: As stated above. Imaging Results: CXR on 1/4 with poor inspiration, no significant pulmonary abnormalities.      GOALS  Goal

## 2019-01-10 NOTE — PLAN OF CARE
Problem: Patient Centered Care  Goal: Patient preferences are identified and integrated in the patient's plan of care  Interventions:  - What would you like us to know as we care for you?   - Provide timely, complete, and accurate information to patient/fa with activity based on assessment  - Modify environment to reduce risk of injury  - Provide assistive devices as appropriate  - Consider OT/PT consult to assist with strengthening/mobility  - Encourage toileting schedule  Outcome: Adequate for Discharge needed  Outcome: Adequate for Discharge    Goal: Oral mucous membranes remain intact  INTERVENTIONS  - Assess oral mucosa and hygiene practices  - Implement preventative oral hygiene regimen  - Implement oral medicated treatments as ordered  Outcome: Adequ

## 2019-01-10 NOTE — DISCHARGE SUMMARY
[place] [facility] - [facility]    Discharge Summary    Fito Scruggs Patient Status:  Inpatient    1935 MRN [id number]   Location [facility] 3W/SW Attending No att. providers found   Hosp Day # 5 PCP None Pcp     Date of Admission: 1/3/201 significant for recently diagnosed pancreatic cancer started on chemotherapy including gemcitabine and paclitaxel completed his second round 2 days ago however since then has been extremely lethargic, confused with poor appetite.   As per family patient has cardiology    Discharge Condition:  Stable    Discharge Medications:      Discharge Medications      START taking these medications      Instructions Prescription details   acetaminophen 10 MG/ML Soln  Commonly known as:  OFIRMEV      Inject 100 mL (1,000 HYDROcodone-acetaminophen 5-325 MG Tabs  Commonly known as:  NORCO        metoprolol tartrate 10mg/ml Susp  Commonly known as:  LOPRESSOR  Replaced by:  metoprolol Tartrate 5 MG/5ML Soln        Pravastatin Sodium 10 MG Tabs  Commonly known as:  PRAVACHOL

## 2019-01-10 NOTE — SLP NOTE
SPEECH DAILY NOTE - INPATIENT    ASSESSMENT & PLAN   ASSESSMENT  Pt seen sitting upright in bed on limited PO intake with ice chip trials only. Pt remained fairly agitated throughout session.  Oral suction utilized at baseline and throughout session as kyra indicated. Ice chip trials only. Pt with cough and use of oral suction following all ice chip trials. Pt is NOT ok to have ice chips at this time. Rec continuation of NPO with strict oral care with use of sponge only. Suction PRN per RN staff.    Not me

## 2019-01-15 ENCOUNTER — TELEPHONE (OUTPATIENT)
Dept: HEMATOLOGY/ONCOLOGY | Facility: HOSPITAL | Age: 84
End: 2019-01-15

## 2019-01-15 NOTE — TELEPHONE ENCOUNTER
Meaghan Mathias we received medical records for Roscoe Paiz this morning I will bring back. Dr Christian Eaton seen patient in the hospital do you want a phfu? Please advise.  monie

## 2019-01-15 NOTE — TELEPHONE ENCOUNTER
Patient transferred back to [facility] where he was initially treated for his pancreatic carcinoma, CAD, COPD, etc.  He had refused additional chemotherapy previously at the [place].   He may be hospitalized again at [place] since he lives down the street and we

## 2021-02-03 DIAGNOSIS — Z23 NEED FOR VACCINATION: ICD-10-CM

## (undated) NOTE — LETTER
[address] [facility], [facility] [address]     I agree to have a Peripherally Inserted Central Catheter (PICC) placed in my arm.      1. The PICC insertion procedure, care, maintenance, risks, benefits, and complications Statement of Physician: My signature below affirms that prior to the time of the PICC line insertion, I have explained to the patient and/or his/her legal representative, the risks and benefits involved in the proposed treatment and any reasonable alternat

## (undated) NOTE — IP AVS SNAPSHOT
Patient Demographics     Address  [address] [place] [address]  [place] [address] Phone  [phone] (Home) *Preferred*      Emergency Contact(s)     Name Relation Home Work Liza Daughter [phone]      Diamond Zavala Daughter   [phone] Stop taking on:  1/19/2019   Sp Rhodes MD         Sodium Chloride 0.9 % SOLN 10 mL with Pantoprazole Sodium 40 MG SOLR 40 mg  Next dose due: Tomorrow 1/10      Inject 40 mg into the vein daily.    Mp Cavazos MD               Where to Get Your Medic [id number] Piperacillin Sod-Tazobactam So (ZOSYN) 3.375 g in dextrose 5 % 100 mL ADD-vantage 01/09/19 1310 New Bag      [id number] Sodium Chloride 0.9 % solution 01/08/19 2328 Given      [id number] acetaminophen (OFIRMEV) infusion 1,000 mg 01/08/19 2323 Give Resp  20 Filed at 01/09/2019 0855   Temp  97.6 °F (36.4 °C) Filed at 01/09/2019 0855   SpO2  97 % Filed at 01/09/2019 0855      Patient's Most Recent Weight       Most Recent Value   Patient Weight  92.1 kg (203 lb)         Lab Results Last 24 Hours      C Calcium, Total 7.2 8.5 - 10.5 mg/dL  [facility]   BUN/CREA Ratio 24.3 10.0 - 20.0 [facility]   Anion Gap 8 0 - 18 mmol/L — [facility]   Calculated Osmolality 325 275 - 295 mOsm/kg [facility]   GFR, Non- 48 >=60  [facility]   G C. Difficile Toxin B Gene Negative    Blood Culture FREQ X 2 [[id number]]  (Abnormal)  (Susceptibility) Collected:  01/04/19 0003    Order Status:  Completed Lab Status:  Final result Updated:  01/06/19 0748    Specimen:  Blood,peripheral      Blood Cultu Order Status:  Completed Lab Status:  Final result Updated:  01/04/19 0140    Specimen:  Other from [facility]      MRSA Screen By PCR Negative         H&P - H&P Note      H&P signed by Paolo Baez MD at 1/4/2019  8:04 AM  Version 1 of 1    Author:  Sara Baltazar, History reviewed. No pertinent family history. reports that  has never smoked. he has never used smokeless tobacco. He reports that he does not drink alcohol or use drugs.     Allergies:    Ativan [Lorazepam]      JITTERY    Home Medications:  Prior to Ad no organomegaly. Lymphatics:  No lymphadenopathy neck, axilla, groin. Musculoskeletal: Normal range of motion. normal strength. Feet:  Normal pulses. Neurologic:  Alert, oriented, no focal deficits, cranial nerves II-XII are grossly intact.   Cognition Likely chemo related, continue oral hygiene, will remain n.p.o. for now.     Prophylaxis  Subcutaneous heparin    CODE STATUS  Full    Primary care physician  None Pcp    Disposition  Clinical course will dictate outcome      Peewee Scott MD  1/4/2019  7 during the summer 2018. He was found to have an abnormal CT scan of the abdomen and was scheduled to have a biopsy of a pancreatic mass. His aspirin and Plavix were held. Patient was seen here 10/20/2018 with confusion and expressive aphasia.   He was th Clopidogrel Bisulfate 300 MG Oral Tab Take 75 mg by mouth once. tamsulosin HCl 0.4 MG Oral Cap Take 0.4 mg by mouth daily.        Review of Systems:   Review of Systems[ME.1]  Patient is not able to respond to review of systems[ME.3]  Physical Exam:   V TROP 0.00 10/20/2018     Ct Brain Or Head ([id number])    Result Date: 1/4/2019  CONCLUSION:  Motion-compromised examination. Within these parameters: 1.  There is poorly defined hypodensity in the left parietal lobe which may reflect acute or evolving subacute CONCLUSION:  1. No evidence of acute central pulmonary embolism to the level of the main pulmonary artery branches. More distal branches are not diagnostically assessed.   2. Diffuse esophageal wall thickening, concerning for potential underlying esophagiti changes  Low voltage -possible pulmonary disease.  ABNORMAL When compared with ECG of 01/03/2019 21:54:15 No significant changes have occurred Electronically signed on 01/04/2019 at 14:24 by April Newton MD    Ekg 12-lead    Result Date: 1/3/2019  ECG ME.2 - Kathryn Walker MD on 1/4/2019  7:47 PM  ME.3 - Kathryn Walker MD on 1/4/2019  7:55 PM  ME.4 - Kathryn Walker MD on 1/4/2019  8:39 PM                     D/C Summary     No notes of this type exist for this encounter.          Physical The Lethargic;constant cuing for participation provided. OBJECTIVE[CK.1]  Precautions: Bed/chair alarm[CK. 2]    WEIGHT BEARING RESTRICTION[CK.1]  Weight Bearing Restriction: None[CK. 2]                PAIN ASSESSMENT[CK. 1]   Rating: Unable to rate[CK. 2] assistance level: minimum assistance with walker - rolling     Goal #2  Current Status NT   Goal #3 Patient is able to ambulate 100 feet with assist device: walker - rolling at assistance level: minimum assistance   Goal #3   Current Status Nt   Goal #4 Pa cleanup.  Pt does have a drop in BP with activity 115/73mmHg with activity and 142/78 at rest. Pt is on 5L of O2 and does not desat, HR raises appropriately up to 105bpm.The patient's Approx Degree of Impairment: 72.57% has been calculated based on document Past Surgical History:   Procedure Laterality Date   • CABG     • CATH BARE METAL STENT (BMS)     • CHOLECYSTECTOMY     • EXCIS LUMBAR DISK,ONE LEVEL     • EYE SURGERY     • HERNIA SURGERY         HOME SITUATION  Type of Home: House   Home Layout: One [facility] Exercise/Education Provided:  Bed mobility, transfers    Patient End of Session: Up in chair    CURRENT GOALSGoals to be met by: 1/20/19  Patient Goal Patient's self-stated goal is: walk   Goal #1 Patient is able to demonstrate supine - sit EOB @ level: mo PT/OT coeval, pt is agreeable but confused and minimally verbal. Pt requires min to mod Ax2 for out of bed to chair with use of RW. Pt requires assist to manage walker as well as direction for hand placement on chair arm rests.  Pt is able to communicate th • History of blood transfusion    • [facility] ([facility] 1/4/2019   • Pancreatic cancer [facility])    • Personal history of antineoplastic chemotherapy    • Pulmonary embolism ([facility]    • Sepsis ([facility] 1/4/2019   • Skin cancer    • Stroke ( -   Climbing 3-5 steps with a railing?: Total     AM-PAC Score:  Raw Score: 11   Approx Degree of Impairment: 72.57%   Standardized Score (AM-PAC Scale): 33.86   CMS Modifier (G-Code): CL    FUNCTIONAL ABILITY STATUS  Gait Assessment   Bed to chair transfe reports he was up most of the morning. She was educated on exercises for BUE to perform with pt when he is more alert and able to participate. RN Ellyn Li aware. Will continue to follow. [MS.1]      Attribution Key    MS. 1 - Roger Aragon OT on 1/9/2019  9:28 this session. Pt with fair oral acceptance and bilabial seal across all trials. Anterior loss of bolus on ice chip trial noted x1. Pt with increased oral transit time and bolus formation. Trigger of the pharyngeal phase of the swallow appeared delayed.  Edita Past Medical History:   Diagnosis Date   • Atherosclerosis of coronary artery    • Back problem    • Bacteremia 1/7/2019   • CHF (congestive heart failure) (HCC)     ef 35%   • Essential hypertension    • Exposure to medical diagnostic radiation    • Gout

## (undated) NOTE — IP AVS SNAPSHOT
[facility]            (For Outpatient Use Only) Initial Admit Date: 1/3/2019   Inpt/Obs Admit Date: Inpt: 1/4/19 / Obs: N/A   Discharge Date:    Pb Culp:  [de-identified]   MRN: [de-identified]   CSN: [id number]        [facility] Subscriber ID:  Pt Rel to Subscriber:    Hospital Account Financial Class: Medicare    January 9, 2019